# Patient Record
Sex: MALE | Race: WHITE | Employment: OTHER | URBAN - METROPOLITAN AREA
[De-identification: names, ages, dates, MRNs, and addresses within clinical notes are randomized per-mention and may not be internally consistent; named-entity substitution may affect disease eponyms.]

---

## 2023-01-03 ENCOUNTER — HOSPITAL ENCOUNTER (INPATIENT)
Age: 73
LOS: 6 days | Discharge: HOME | DRG: 177 | End: 2023-01-09
Attending: EMERGENCY MEDICINE | Admitting: INTERNAL MEDICINE
Payer: OTHER GOVERNMENT

## 2023-01-03 ENCOUNTER — APPOINTMENT (OUTPATIENT)
Dept: CT IMAGING | Age: 73
DRG: 177 | End: 2023-01-03
Payer: OTHER GOVERNMENT

## 2023-01-03 DIAGNOSIS — E83.42 HYPOMAGNESEMIA: ICD-10-CM

## 2023-01-03 DIAGNOSIS — U07.1 COVID-19: ICD-10-CM

## 2023-01-03 DIAGNOSIS — E87.6 HYPOKALEMIA: ICD-10-CM

## 2023-01-03 DIAGNOSIS — J96.00 ACUTE RESPIRATORY FAILURE DUE TO COVID-19 (HCC): Primary | ICD-10-CM

## 2023-01-03 DIAGNOSIS — U07.1 ACUTE RESPIRATORY FAILURE DUE TO COVID-19 (HCC): Primary | ICD-10-CM

## 2023-01-03 PROBLEM — J96.20 ACUTE ON CHRONIC RESPIRATORY FAILURE (HCC): Status: ACTIVE | Noted: 2023-01-03

## 2023-01-03 LAB
A/G RATIO: 1.3 (ref 1.1–2.2)
ALBUMIN SERPL-MCNC: 3.7 G/DL (ref 3.4–5)
ALP BLD-CCNC: 117 U/L (ref 40–129)
ALT SERPL-CCNC: 35 U/L (ref 10–40)
ANION GAP SERPL CALCULATED.3IONS-SCNC: 10 MMOL/L (ref 3–16)
AST SERPL-CCNC: 45 U/L (ref 15–37)
BASE EXCESS VENOUS: 2.4 MMOL/L (ref -3–3)
BASOPHILS ABSOLUTE: 0 K/UL (ref 0–0.2)
BASOPHILS RELATIVE PERCENT: 1.4 %
BILIRUB SERPL-MCNC: 0.6 MG/DL (ref 0–1)
BUN BLDV-MCNC: 11 MG/DL (ref 7–20)
CALCIUM SERPL-MCNC: 8.3 MG/DL (ref 8.3–10.6)
CARBOXYHEMOGLOBIN: 2.8 % (ref 0–1.5)
CHLORIDE BLD-SCNC: 101 MMOL/L (ref 99–110)
CO2: 29 MMOL/L (ref 21–32)
CREAT SERPL-MCNC: 0.7 MG/DL (ref 0.8–1.3)
EKG ATRIAL RATE: 88 BPM
EKG DIAGNOSIS: NORMAL
EKG P AXIS: 87 DEGREES
EKG P-R INTERVAL: 174 MS
EKG Q-T INTERVAL: 396 MS
EKG QRS DURATION: 70 MS
EKG QTC CALCULATION (BAZETT): 479 MS
EKG R AXIS: -43 DEGREES
EKG T AXIS: 53 DEGREES
EKG VENTRICULAR RATE: 88 BPM
EOSINOPHILS ABSOLUTE: 0 K/UL (ref 0–0.6)
EOSINOPHILS RELATIVE PERCENT: 1.3 %
GFR SERPL CREATININE-BSD FRML MDRD: >60 ML/MIN/{1.73_M2}
GLUCOSE BLD-MCNC: 104 MG/DL (ref 70–99)
HCO3 VENOUS: 29.1 MMOL/L (ref 23–29)
HCT VFR BLD CALC: 38 % (ref 40.5–52.5)
HEMOGLOBIN: 12.4 G/DL (ref 13.5–17.5)
INFLUENZA A: NOT DETECTED
INFLUENZA B: NOT DETECTED
LACTIC ACID, SEPSIS: 1.8 MMOL/L (ref 0.4–1.9)
LYMPHOCYTES ABSOLUTE: 1 K/UL (ref 1–5.1)
LYMPHOCYTES RELATIVE PERCENT: 36.9 %
MAGNESIUM: 1.7 MG/DL (ref 1.8–2.4)
MCH RBC QN AUTO: 31.8 PG (ref 26–34)
MCHC RBC AUTO-ENTMCNC: 32.7 G/DL (ref 31–36)
MCV RBC AUTO: 97.4 FL (ref 80–100)
METHEMOGLOBIN VENOUS: 0.3 %
MONOCYTES ABSOLUTE: 0.4 K/UL (ref 0–1.3)
MONOCYTES RELATIVE PERCENT: 15 %
NEUTROPHILS ABSOLUTE: 1.3 K/UL (ref 1.7–7.7)
NEUTROPHILS RELATIVE PERCENT: 45.4 %
O2 CONTENT, VEN: 7 VOL %
O2 SAT, VEN: 37 %
O2 THERAPY: ABNORMAL
PCO2, VEN: 53.4 MMHG (ref 40–50)
PDW BLD-RTO: 18.2 % (ref 12.4–15.4)
PH VENOUS: 7.35 (ref 7.35–7.45)
PLATELET # BLD: 187 K/UL (ref 135–450)
PMV BLD AUTO: 8.8 FL (ref 5–10.5)
PO2, VEN: 23.1 MMHG (ref 25–40)
POTASSIUM REFLEX MAGNESIUM: 3.4 MMOL/L (ref 3.5–5.1)
PRO-BNP: 119 PG/ML (ref 0–449)
PROCALCITONIN: 0.08 NG/ML (ref 0–0.15)
RBC # BLD: 3.9 M/UL (ref 4.2–5.9)
SARS-COV-2 RNA, RT PCR: DETECTED
SODIUM BLD-SCNC: 140 MMOL/L (ref 136–145)
TCO2 CALC VENOUS: 31 MMOL/L
TOTAL PROTEIN: 6.6 G/DL (ref 6.4–8.2)
TROPONIN: <0.01 NG/ML
WBC # BLD: 2.8 K/UL (ref 4–11)

## 2023-01-03 PROCEDURE — 85025 COMPLETE CBC W/AUTO DIFF WBC: CPT

## 2023-01-03 PROCEDURE — 83605 ASSAY OF LACTIC ACID: CPT

## 2023-01-03 PROCEDURE — 84484 ASSAY OF TROPONIN QUANT: CPT

## 2023-01-03 PROCEDURE — 6370000000 HC RX 637 (ALT 250 FOR IP)

## 2023-01-03 PROCEDURE — 36415 COLL VENOUS BLD VENIPUNCTURE: CPT

## 2023-01-03 PROCEDURE — 93010 ELECTROCARDIOGRAM REPORT: CPT | Performed by: INTERNAL MEDICINE

## 2023-01-03 PROCEDURE — 71260 CT THORAX DX C+: CPT | Performed by: PHYSICIAN ASSISTANT

## 2023-01-03 PROCEDURE — 83880 ASSAY OF NATRIURETIC PEPTIDE: CPT

## 2023-01-03 PROCEDURE — 94640 AIRWAY INHALATION TREATMENT: CPT

## 2023-01-03 PROCEDURE — 93005 ELECTROCARDIOGRAM TRACING: CPT | Performed by: PHYSICIAN ASSISTANT

## 2023-01-03 PROCEDURE — 2580000003 HC RX 258: Performed by: PHYSICIAN ASSISTANT

## 2023-01-03 PROCEDURE — 2700000000 HC OXYGEN THERAPY PER DAY

## 2023-01-03 PROCEDURE — 6370000000 HC RX 637 (ALT 250 FOR IP): Performed by: PHYSICIAN ASSISTANT

## 2023-01-03 PROCEDURE — 94761 N-INVAS EAR/PLS OXIMETRY MLT: CPT

## 2023-01-03 PROCEDURE — 6360000002 HC RX W HCPCS: Performed by: PHYSICIAN ASSISTANT

## 2023-01-03 PROCEDURE — 6360000002 HC RX W HCPCS

## 2023-01-03 PROCEDURE — 2580000003 HC RX 258

## 2023-01-03 PROCEDURE — 99222 1ST HOSP IP/OBS MODERATE 55: CPT

## 2023-01-03 PROCEDURE — 84145 PROCALCITONIN (PCT): CPT

## 2023-01-03 PROCEDURE — 80053 COMPREHEN METABOLIC PANEL: CPT

## 2023-01-03 PROCEDURE — 83735 ASSAY OF MAGNESIUM: CPT

## 2023-01-03 PROCEDURE — 87040 BLOOD CULTURE FOR BACTERIA: CPT

## 2023-01-03 PROCEDURE — 99285 EMERGENCY DEPT VISIT HI MDM: CPT

## 2023-01-03 PROCEDURE — 6360000004 HC RX CONTRAST MEDICATION: Performed by: PHYSICIAN ASSISTANT

## 2023-01-03 PROCEDURE — 96365 THER/PROPH/DIAG IV INF INIT: CPT

## 2023-01-03 PROCEDURE — 82803 BLOOD GASES ANY COMBINATION: CPT

## 2023-01-03 PROCEDURE — 87636 SARSCOV2 & INF A&B AMP PRB: CPT

## 2023-01-03 PROCEDURE — 1200000000 HC SEMI PRIVATE

## 2023-01-03 RX ORDER — PANTOPRAZOLE SODIUM 40 MG/1
1 TABLET, DELAYED RELEASE ORAL DAILY
COMMUNITY
Start: 2022-12-13

## 2023-01-03 RX ORDER — 0.9 % SODIUM CHLORIDE 0.9 %
1000 INTRAVENOUS SOLUTION INTRAVENOUS ONCE
Status: COMPLETED | OUTPATIENT
Start: 2023-01-03 | End: 2023-01-03

## 2023-01-03 RX ORDER — POTASSIUM CHLORIDE 20 MEQ/1
40 TABLET, EXTENDED RELEASE ORAL PRN
Status: DISCONTINUED | OUTPATIENT
Start: 2023-01-03 | End: 2023-01-09 | Stop reason: HOSPADM

## 2023-01-03 RX ORDER — POTASSIUM CHLORIDE 20 MEQ/1
40 TABLET, EXTENDED RELEASE ORAL ONCE
Status: COMPLETED | OUTPATIENT
Start: 2023-01-03 | End: 2023-01-03

## 2023-01-03 RX ORDER — ENOXAPARIN SODIUM 100 MG/ML
30 INJECTION SUBCUTANEOUS 2 TIMES DAILY
Status: DISCONTINUED | OUTPATIENT
Start: 2023-01-03 | End: 2023-01-09 | Stop reason: HOSPADM

## 2023-01-03 RX ORDER — POLYETHYLENE GLYCOL 3350 17 G/17G
17 POWDER, FOR SOLUTION ORAL DAILY PRN
Status: DISCONTINUED | OUTPATIENT
Start: 2023-01-03 | End: 2023-01-09 | Stop reason: HOSPADM

## 2023-01-03 RX ORDER — ACETAMINOPHEN 650 MG/1
650 SUPPOSITORY RECTAL EVERY 6 HOURS PRN
Status: DISCONTINUED | OUTPATIENT
Start: 2023-01-03 | End: 2023-01-09 | Stop reason: HOSPADM

## 2023-01-03 RX ORDER — GABAPENTIN 300 MG/1
1 CAPSULE ORAL 3 TIMES DAILY
COMMUNITY
Start: 2022-12-02

## 2023-01-03 RX ORDER — DRONABINOL 5 MG/1
1 CAPSULE ORAL 2 TIMES DAILY
COMMUNITY
Start: 2022-12-30

## 2023-01-03 RX ORDER — ONDANSETRON 2 MG/ML
4 INJECTION INTRAMUSCULAR; INTRAVENOUS EVERY 6 HOURS PRN
Status: DISCONTINUED | OUTPATIENT
Start: 2023-01-03 | End: 2023-01-09 | Stop reason: HOSPADM

## 2023-01-03 RX ORDER — MORPHINE SULFATE 30 MG/1
1 TABLET, FILM COATED, EXTENDED RELEASE ORAL EVERY 8 HOURS
COMMUNITY
Start: 2022-11-28

## 2023-01-03 RX ORDER — ONDANSETRON HYDROCHLORIDE 8 MG/1
1 TABLET, FILM COATED ORAL EVERY 8 HOURS PRN
COMMUNITY
Start: 2022-11-04

## 2023-01-03 RX ORDER — DEXAMETHASONE SODIUM PHOSPHATE 10 MG/ML
6 INJECTION, SOLUTION INTRAMUSCULAR; INTRAVENOUS EVERY 24 HOURS
Status: DISCONTINUED | OUTPATIENT
Start: 2023-01-03 | End: 2023-01-05

## 2023-01-03 RX ORDER — MAGNESIUM SULFATE IN WATER 40 MG/ML
2000 INJECTION, SOLUTION INTRAVENOUS PRN
Status: DISCONTINUED | OUTPATIENT
Start: 2023-01-03 | End: 2023-01-09 | Stop reason: HOSPADM

## 2023-01-03 RX ORDER — PANTOPRAZOLE SODIUM 40 MG/1
40 TABLET, DELAYED RELEASE ORAL DAILY
Status: DISCONTINUED | OUTPATIENT
Start: 2023-01-03 | End: 2023-01-09 | Stop reason: HOSPADM

## 2023-01-03 RX ORDER — ACETAMINOPHEN 325 MG/1
650 TABLET ORAL EVERY 6 HOURS PRN
Status: DISCONTINUED | OUTPATIENT
Start: 2023-01-03 | End: 2023-01-09 | Stop reason: HOSPADM

## 2023-01-03 RX ORDER — GABAPENTIN 300 MG/1
300 CAPSULE ORAL 3 TIMES DAILY
Status: DISCONTINUED | OUTPATIENT
Start: 2023-01-03 | End: 2023-01-09 | Stop reason: HOSPADM

## 2023-01-03 RX ORDER — OXYCODONE HYDROCHLORIDE 5 MG/1
1 TABLET ORAL
COMMUNITY
Start: 2022-12-20

## 2023-01-03 RX ORDER — LISINOPRIL 30 MG/1
30 TABLET ORAL DAILY
Status: ON HOLD | COMMUNITY
End: 2023-01-09 | Stop reason: HOSPADM

## 2023-01-03 RX ORDER — BUDESONIDE AND FORMOTEROL FUMARATE DIHYDRATE 160; 4.5 UG/1; UG/1
2 AEROSOL RESPIRATORY (INHALATION) 2 TIMES DAILY
COMMUNITY

## 2023-01-03 RX ORDER — DEXAMETHASONE 2 MG/1
1 TABLET ORAL 3 TIMES DAILY
Status: ON HOLD | COMMUNITY
Start: 2022-11-14 | End: 2023-01-09 | Stop reason: HOSPADM

## 2023-01-03 RX ORDER — MAGNESIUM SULFATE IN WATER 40 MG/ML
2000 INJECTION, SOLUTION INTRAVENOUS ONCE
Status: COMPLETED | OUTPATIENT
Start: 2023-01-03 | End: 2023-01-03

## 2023-01-03 RX ORDER — ONDANSETRON 4 MG/1
4 TABLET, ORALLY DISINTEGRATING ORAL EVERY 8 HOURS PRN
Status: DISCONTINUED | OUTPATIENT
Start: 2023-01-03 | End: 2023-01-09 | Stop reason: HOSPADM

## 2023-01-03 RX ORDER — SODIUM CHLORIDE 0.9 % (FLUSH) 0.9 %
5-40 SYRINGE (ML) INJECTION EVERY 12 HOURS SCHEDULED
Status: DISCONTINUED | OUTPATIENT
Start: 2023-01-03 | End: 2023-01-09 | Stop reason: HOSPADM

## 2023-01-03 RX ORDER — FLUTICASONE PROPIONATE AND SALMETEROL 250; 50 UG/1; UG/1
1 POWDER RESPIRATORY (INHALATION) 2 TIMES DAILY
COMMUNITY
End: 2023-01-03

## 2023-01-03 RX ORDER — PROCHLORPERAZINE MALEATE 10 MG
1 TABLET ORAL EVERY 6 HOURS PRN
Status: ON HOLD | COMMUNITY
Start: 2022-11-30 | End: 2023-01-09 | Stop reason: HOSPADM

## 2023-01-03 RX ORDER — SODIUM CHLORIDE 0.9 % (FLUSH) 0.9 %
5-40 SYRINGE (ML) INJECTION PRN
Status: DISCONTINUED | OUTPATIENT
Start: 2023-01-03 | End: 2023-01-09 | Stop reason: HOSPADM

## 2023-01-03 RX ORDER — POTASSIUM CHLORIDE 7.45 MG/ML
10 INJECTION INTRAVENOUS PRN
Status: DISCONTINUED | OUTPATIENT
Start: 2023-01-03 | End: 2023-01-09 | Stop reason: HOSPADM

## 2023-01-03 RX ORDER — AMLODIPINE BESYLATE 10 MG/1
10 TABLET ORAL DAILY
Status: ON HOLD | COMMUNITY
End: 2023-01-09 | Stop reason: HOSPADM

## 2023-01-03 RX ORDER — SODIUM CHLORIDE 9 MG/ML
INJECTION, SOLUTION INTRAVENOUS PRN
Status: DISCONTINUED | OUTPATIENT
Start: 2023-01-03 | End: 2023-01-09 | Stop reason: HOSPADM

## 2023-01-03 RX ADMIN — POTASSIUM CHLORIDE 40 MEQ: 1500 TABLET, EXTENDED RELEASE ORAL at 14:11

## 2023-01-03 RX ADMIN — GABAPENTIN 300 MG: 300 CAPSULE ORAL at 20:21

## 2023-01-03 RX ADMIN — PANTOPRAZOLE SODIUM 40 MG: 40 TABLET, DELAYED RELEASE ORAL at 18:44

## 2023-01-03 RX ADMIN — DEXAMETHASONE SODIUM PHOSPHATE 6 MG: 10 INJECTION INTRAMUSCULAR; INTRAVENOUS at 18:44

## 2023-01-03 RX ADMIN — SODIUM CHLORIDE 1000 ML: 9 INJECTION, SOLUTION INTRAVENOUS at 13:19

## 2023-01-03 RX ADMIN — Medication 10 ML: at 20:22

## 2023-01-03 RX ADMIN — MAGNESIUM SULFATE HEPTAHYDRATE 2000 MG: 40 INJECTION, SOLUTION INTRAVENOUS at 14:14

## 2023-01-03 RX ADMIN — IPRATROPIUM BROMIDE AND ALBUTEROL 1 PUFF: 20; 100 SPRAY, METERED RESPIRATORY (INHALATION) at 20:24

## 2023-01-03 RX ADMIN — ENOXAPARIN SODIUM 30 MG: 100 INJECTION SUBCUTANEOUS at 20:21

## 2023-01-03 RX ADMIN — IOPAMIDOL 85 ML: 755 INJECTION, SOLUTION INTRAVENOUS at 13:45

## 2023-01-03 ASSESSMENT — ENCOUNTER SYMPTOMS
SHORTNESS OF BREATH: 1
COUGH: 1
GASTROINTESTINAL NEGATIVE: 1

## 2023-01-03 ASSESSMENT — PAIN - FUNCTIONAL ASSESSMENT: PAIN_FUNCTIONAL_ASSESSMENT: NONE - DENIES PAIN

## 2023-01-03 NOTE — PLAN OF CARE
66yo PMHx lung cancer on 1-2L O2 at baseline  Presented to [facility] ED with increased SOB  Requiring 3-4L in ED  Covid+  Admit to 2W with telemetry       Ramses Powers PA-C  1/3/2023   3:13 PM

## 2023-01-03 NOTE — SUBJECTIVE & OBJECTIVE
Subjective:     ***    Objective:     Vitals:    01/03/23 1600 01/03/23 1631 01/03/23 1700 01/03/23 1800   BP: 116/75 113/76 105/73    Pulse: 78 71 79    Resp: 26 30 (!) 33    Temp:    97 °F (36.1 °C)   TempSrc:       SpO2: 96% 96%     Weight:       Height:            Intake andOutput:  Current Shift: No intake/output data recorded. Last three shifts: No intake/output data recorded.      Physical Exam      Medications:  Current Facility-Administered Medications   Medication Dose Route Frequency    sodium chloride flush 0.9 % injection 5-40 mL  5-40 mL IntraVENous 2 times per day    sodium chloride flush 0.9 % injection 5-40 mL  5-40 mL IntraVENous PRN    0.9 % sodium chloride infusion   IntraVENous PRN    enoxaparin Sodium (LOVENOX) injection 30 mg  30 mg SubCUTAneous BID    ondansetron (ZOFRAN-ODT) disintegrating tablet 4 mg  4 mg Oral Q8H PRN    Or    ondansetron (ZOFRAN) injection 4 mg  4 mg IntraVENous Q6H PRN    polyethylene glycol (GLYCOLAX) packet 17 g  17 g Oral Daily PRN    acetaminophen (TYLENOL) tablet 650 mg  650 mg Oral Q6H PRN    Or    acetaminophen (TYLENOL) suppository 650 mg  650 mg Rectal Q6H PRN    dexamethasone (PF) (DECADRON) injection 6 mg  6 mg IntraVENous Q24H    albuterol-ipratropium (COMBIVENT RESPIMAT)  MCG/ACT inhaler 1 puff  1 puff Inhalation Q6H    potassium chloride (KLOR-CON M) extended release tablet 40 mEq  40 mEq Oral PRN    Or    potassium bicarb-citric acid (EFFER-K) effervescent tablet 40 mEq  40 mEq Oral PRN    Or    potassium chloride 10 mEq/100 mL IVPB (Peripheral Line)  10 mEq IntraVENous PRN    magnesium sulfate 2000 mg in 50 mL IVPB premix  2,000 mg IntraVENous PRN    pantoprazole (PROTONIX) tablet 40 mg  40 mg Oral Daily    gabapentin (NEURONTIN) capsule 300 mg  300 mg Oral TID        Medications Reviewed    :

## 2023-01-03 NOTE — ED PROVIDER NOTES
I independently performed a history and physical on Marty. All diagnostic, treatment, and disposition decisions were made by myself in conjunction with the advanced practice provider/resident. For further details of [facility] emergency department encounter, please see the MARIA DEL CARMEN/resident's documentation. I personally saw the patient and performed a substantive portion of the visit including all aspects of the medical decision making. Briefly, this is a 67-year-old male presenting for evaluation of shortness of breath. Was referred to ED from his oncologist because of increasing shortness of breath. 86% on room air. He is requiring supplemental O2 increased from baseline. On exam no overt respiratory distress. He has intact mentation no focal neurological deficits. Because of his cancer history, shortness of breath, CT chest to rule out PE has been ordered. He will ultimately require admission for continued management of increased oxygen requirement in the setting of COVID 19. EKG without ischemic change. EKG  The Ekg interpreted by myself in the emergency department in the absence of a cardiologist.  normal sinus rhythm with a rate of 88  Axis is   Left axis deviation  QTc is   479  Intervals and Durations are unremarkable. No specific ST-T wave changes appreciated. No evidence of acute ischemia. No prior EKG available for comparison      I personally saw the patient and independently provided 25 minutes of non-concurrent critical care out of the total shared critical care time provided. I, Dr. Maury Enriquez, am the primary clinician of record. Comment: Please note this report has been produced using speech recognition software and may contain errors related to that system including errors in grammar, punctuation, and spelling, as well as words and phrases that may be inappropriate.  If there are any questions or concerns please feel free to contact the dictating provider for clarification.      Dawson Dorantes MD  01/03/23 4843

## 2023-01-03 NOTE — H&P
Hospital Medicine History & Physical      PCP: No primary care provider on file. Date of Admission: 1/3/2023    Date of Service: Pt seen/examined on 01/03/23       Chief Complaint:    Chief Complaint   Patient presents with    Shortness of Breath     Pt comes in upon recommendation from his oncologist due to increasing SOB that has been worsening over the past week. Pt's POW states that pt tested positive for Covid yesterday. Oxygen is 86% on room air. History Of Present Illness: The patient is a 74 y.o. male with COPD and lung cancer who was referred to [facility] ED ED by his oncologist regarding concern of increased SOB x2 weeks. States this is especially worse with exertion. He also has a productive cough, slightly worse than his baseline cough. He reports compliance with home COPD medications. Denies associated fever, chills, nausea, vomiting, constipation, abdominal pain, or chest pain. He does endorse diarrhea ongoing for the last week, approximately 3-5 loose bowel movements per day. Denies blood in stool. Found to have acute on chronic respiratory failure 2/2 Covid and COPD exacerbation. Past Medical History:        Diagnosis Date    COPD (chronic obstructive pulmonary disease) ([facility] [address]     Lung cancer ([facility] [address]        Past Surgical History:    History reviewed. No pertinent surgical history. Medications Prior to Admission:    Prior to Admission medications    Medication Sig Start Date End Date Taking? Authorizing Provider   dexamethasone (DECADRON) 2 MG tablet Take 1 tablet by mouth in the morning, at noon, and at bedtime 11/14/22  Yes Historical Provider, MD   gabapentin (NEURONTIN) 300 MG capsule Take 1 capsule by mouth in the morning, at noon, and at bedtime. 12/2/22   Historical Provider, MD   dronabinol (MARINOL) 5 MG capsule Take 1 capsule by mouth 2 times daily. Before lunch and dinner.  12/30/22   Historical Provider, MD   amLODIPine (NORVASC) 10 MG tablet Take 10 mg by mouth daily Historical Provider, MD   budesonide-formoterol (SYMBICORT) 160-4.5 MCG/ACT AERO Inhale 2 puffs into the lungs in the morning and at bedtime    Historical Provider, MD   lisinopril (PRINIVIL;ZESTRIL) 30 MG tablet Take 30 mg by mouth daily    Historical Provider, MD   morphine (MS CONTIN) 30 MG extended release tablet Take 1 tablet by mouth in the morning and 1 tablet at noon and 1 tablet in the evening. 11/28/22   Historical Provider, MD   ondansetron (ZOFRAN) 8 MG tablet Take 1 tablet by mouth every 8 hours as needed for Nausea 11/4/22   Historical Provider, MD   oxyCODONE (ROXICODONE) 5 MG immediate release tablet Take 1 tablet by mouth every 4-6 hours as needed for Pain. 12/20/22   Historical Provider, MD   pantoprazole (PROTONIX) 40 MG tablet Take 1 tablet by mouth daily 12/13/22   Historical Provider, MD   prochlorperazine (COMPAZINE) 10 MG tablet Take 1 tablet by mouth every 6 hours as needed for Nausea 11/30/22   Historical Provider, MD       Allergies:  Patient has no known allergies. Social History:  The patient currently lives at home    TOBACCO:   reports that he quit smoking about 13 years ago. His smoking use included cigarettes. He started smoking about 63 years ago. He has never used smokeless tobacco.  ETOH:   reports that he does not currently use alcohol. Family History:   Positive as follows:    History reviewed. No pertinent family history.     REVIEW OF SYSTEMS:     Constitutional: Negative for fever   HENT: Negative for sore throat   Eyes: Negative for redness   Respiratory: +dyspnea, +cough   Cardiovascular: Negative for chest pain   Gastrointestinal: Negative for vomiting, +diarrhea   Genitourinary: Negative for hematuria   Musculoskeletal: Negative for arthralgias   Skin: Negative for rash   Neurological: Negative for syncope   Hematological: Negative for adenopathy   Psychiatric/Behavorial: Negative for anxiety    PHYSICAL EXAM:    /76   Pulse 71   Temp 97.1 °F (36.2 °C) (Oral)   Resp 30   Ht 5' 11\" (1.803 m)   Wt 150 lb (68 kg)   SpO2 96%   BMI 20.92 kg/m²   Gen: No distress. Alert. Eyes: PERRL. No sclera icterus. No conjunctival injection. Neck: No JVD. No Carotid Bruit. Trachea midline. Resp: No accessory muscle use. +Reduced air movement, diffuse wheezing  CV: Regular rate. Regular rhythm. No murmur. No rub. No edema. Peripheral Pulses: +2 palpable, equal bilaterally   GI: Non-tender. Non-distended. No masses. No organomegaly. Normal bowel sounds. No hernia. Skin: Warm and dry. No nodule on exposed extremities. No rash on exposed extremities. M/S: No cyanosis. No joint deformity. No clubbing. Neuro: Awake. Grossly nonfocal    Psych: Oriented x 3. No anxiety. Appears agitated. CBC:   Recent Labs     01/03/23  1255   WBC 2.8*   HGB 12.4*   HCT 38.0*   MCV 97.4        BMP:   Recent Labs     01/03/23  1255      K 3.4*      CO2 29   BUN 11   CREATININE 0.7*     LIVER PROFILE:   Recent Labs     01/03/23  1255   AST 45*   ALT 35   BILITOT 0.6   ALKPHOS 117          CARDIAC ENZYMES  Recent Labs     01/03/23  1255   TROPONINI <0.01       CULTURES  Covid positive  Influenza not detected     EKG:    Normal sinus rhythm  Left axis deviation  Abnormal ECG  No previous ECGs available  Confirmed by DELBERT KELLY, EVANS ([id number]) on 1/3/2023 3:01:27 PM    RADIOLOGY  CT CHEST PULMONARY EMBOLISM W CONTRAST   Final Result   1. Negative for pulmonary embolus. 2. Cavitary 6 cm right upper lobe bronchogenic carcinoma with associated   rightward T2, T3 and T4 vertebral body/costal invasion. 3. Moderate to severe pathologic T3 compression fracture.              ASSESSMENT/PLAN:  #Acute hypoxic respiratory failure  #COPD AE  #COVID  -presented with increasing SOB and productive cough x1 week  -baseline O2 requirement 1L; currently on 3-4L NC --> wean as tolerated  -CTPA negative   -decadron and combivent   -droplet plus precautions  -pulmonology consulted     #Lung cancer  -s/p chemo and radiation  -follows w/ [facility]    #Hypomagnesemia  #Hypokalemia  -replete with IVF  -monitor     #HTN  -BP low on admission  -hold lisinopril and amlodipine for now     #GERD  -continue PPI     DVT Prophylaxis: Lovenox  Diet: No diet orders on file  Code Status: No Order    Beryl Healy PA-C  1/3/2023  5:13 PM

## 2023-01-03 NOTE — ED PROVIDER NOTES
[address] ED  EMERGENCY DEPARTMENT ENCOUNTER        Pt Name: Estephania Gambino  MRN: [id number]  [place] 1950  Date of evaluation: 1/3/2023  Provider: Nya Ornelas PA-C  PCP: No primary care provider on file. Note Started: 12:03 PM EST 1/3/23       I have seen and evaluated this patient with my supervising physician Dr. Toya Hannon. CHIEF COMPLAINT       Chief Complaint   Patient presents with    Shortness of Breath     Pt comes in upon recommendation from his oncologist due to increasing SOB that has been worsening over the past week. Pt's POW states that pt tested positive for Covid yesterday. Oxygen is 86% on room air. HISTORY OF PRESENT ILLNESS: 1 or more Elements     History From: patient  Limitations to history : None    Estephania Gambino is a 74 y.o. male with a past medical history of COPD, lung cancer, former smoker recently completed chemo and radiation brought in today with increased shortness of breath. He does wear oxygen at home typically about 1 L. He has been requiring between 2 and 3 L at home. His home pulse ox was at 86%. Onset of symptoms over the past 1 week. Onset of Symptoms have been persistent since onset. Context includes increased shortness of breath. He does endorse a productive cough. Denies chest pain. Denies fevers chills nausea vomiting diarrhea. Does report decreased appetite. No aggravating symptoms. No alleviating symptoms. He did test positive for COVID yesterday. Has not tried anything at home. Nothing seems to make symptoms better or worse. Otherwise denies any other complaints. Nursing Notes were all reviewed and agreed with or any disagreements were addressed in the HPI. REVIEW OF SYSTEMS :      Review of Systems   Constitutional:  Positive for appetite change. HENT: Negative. Respiratory:  Positive for cough and shortness of breath. Cardiovascular: Negative. Gastrointestinal: Negative.     Genitourinary: Negative. Musculoskeletal: Negative. Skin: Negative. Neurological: Negative. Positives and Pertinent negatives as per HPI. SURGICAL HISTORY   History reviewed. No pertinent surgical history. CURRENTMEDICATIONS       Previous Medications    No medications on file       ALLERGIES     Patient has no known allergies. FAMILYHISTORY     History reviewed. No pertinent family history. SOCIAL HISTORY       Social History     Tobacco Use    Smoking status: Former     Types: Cigarettes     Start date:      Quit date:      Years since quittin.0    Smokeless tobacco: Never   Substance Use Topics    Alcohol use: Not Currently    Drug use: Never       SCREENINGS        Stephen Coma Scale  Eye Opening: Spontaneous  Best Verbal Response: Oriented  Best Motor Response: Obeys commands  Stephen Coma Scale Score: 15                CIWA Assessment  BP: 92/73  Heart Rate: 78           PHYSICAL EXAM  1 or more Elements     ED Triage Vitals [23 1142]   BP Temp Temp Source Heart Rate Resp SpO2 Height Weight   (!) 89/58 97.1 °F (36.2 °C) Oral 88 24 (!) 84 % 5' 11\" (1.803 m) 150 lb (68 kg)       Physical Exam  Vitals and nursing note reviewed. Constitutional:       General: He is awake. He is not in acute distress. Appearance: Normal appearance. He is well-developed. He is ill-appearing. He is not toxic-appearing or diaphoretic. Interventions: Nasal cannula in place. HENT:      Head: Normocephalic and atraumatic. Nose: Nose normal.   Eyes:      General:         Right eye: No discharge. Left eye: No discharge. Cardiovascular:      Rate and Rhythm: Normal rate and regular rhythm. Pulses:           Radial pulses are 2+ on the right side and 2+ on the left side. Heart sounds: Normal heart sounds. No murmur heard. No gallop. Pulmonary:      Effort: Pulmonary effort is normal. No respiratory distress. Breath sounds: Normal breath sounds.  No decreased breath sounds, wheezing, rhonchi or rales. Chest:      Chest wall: No tenderness. Musculoskeletal:         General: No deformity. Normal range of motion. Cervical back: Normal range of motion and neck supple. Right lower leg: No edema. Left lower leg: No edema. Skin:     General: Skin is warm and dry. Neurological:      Mental Status: He is alert and oriented to person, place, and time. Psychiatric:         Behavior: Behavior normal. Behavior is cooperative. DIAGNOSTIC RESULTS   LABS:    Labs Reviewed   COVID-19 & INFLUENZA COMBO - Abnormal; Notable for the following components:       Result Value    SARS-CoV-2 RNA, RT PCR DETECTED (*)     All other components within normal limits   CBC WITH AUTO DIFFERENTIAL - Abnormal; Notable for the following components:    WBC 2.8 (*)     RBC 3.90 (*)     Hemoglobin 12.4 (*)     Hematocrit 38.0 (*)     RDW 18.2 (*)     Neutrophils Absolute 1.3 (*)     All other components within normal limits   COMPREHENSIVE METABOLIC PANEL W/ REFLEX TO MG FOR LOW K - Abnormal; Notable for the following components:    Potassium reflex Magnesium 3.4 (*)     Glucose 104 (*)     Creatinine 0.7 (*)     AST 45 (*)     All other components within normal limits   BLOOD GAS, VENOUS - Abnormal; Notable for the following components:    pCO2, Rajeev 53.4 (*)     pO2, Rajeev 23.1 (*)     HCO3, Venous 29.1 (*)     Carboxyhemoglobin 2.8 (*)     All other components within normal limits   MAGNESIUM - Abnormal; Notable for the following components:    Magnesium 1.70 (*)     All other components within normal limits   CULTURE, BLOOD 2   CULTURE, BLOOD 1   TROPONIN   BRAIN NATRIURETIC PEPTIDE   LACTATE, SEPSIS   PROCALCITONIN       When ordered only abnormal lab results are displayed. All other labs were within normal range or not returned as of this dictation. EKG:  When ordered, EKG's are interpreted by the Emergency Department Physician in the absence of a cardiologist.  Please see their note for interpretation of EKG. RADIOLOGY:   Non-plain film images such as CT, Ultrasound and MRI are read by the radiologist. Plain radiographic images are visualized and preliminarily interpreted by the ED Provider with the below findings:        Interpretation per the Radiologist below, if available at the time of this note:    CT CHEST PULMONARY EMBOLISM W CONTRAST   Final Result   1. Negative for pulmonary embolus. 2. Cavitary 6 cm right upper lobe bronchogenic carcinoma with associated   rightward T2, T3 and T4 vertebral body/costal invasion. 3. Moderate to severe pathologic T3 compression fracture. No results found. No results found. PROCEDURES   Unless otherwise noted below, none     Procedures    CRITICAL CARE TIME (.cctime)     Total Critical Care time was 35 minutes, excluding separately reportable procedures. There was a high probability of clinically significant/life threatening deterioration in the patient's condition which required my urgent intervention. PAST MEDICAL HISTORY      has a past medical history of COPD (chronic obstructive pulmonary disease) ([facility] [address] and Lung cancer ([facility] [address]      EMERGENCY DEPARTMENT COURSE and DIFFERENTIAL DIAGNOSIS/MDM:   Vitals:    Vitals:    01/03/23 1142 01/03/23 1303 01/03/23 1331   BP: (!) 89/58 96/61 92/73   Pulse: 88 81 78   Resp: 24 26 22   Temp: 97.1 °F (36.2 °C)     TempSrc: Oral     SpO2: (!) 84%  95%   Weight: 150 lb (68 kg)     Height: 5' 11\" (1.803 m)         Patient was given the following medications:  Medications   magnesium sulfate 2000 mg in 50 mL IVPB premix (2,000 mg IntraVENous New Bag 1/3/23 1414)   0.9 % sodium chloride bolus (1,000 mLs IntraVENous New Bag 1/3/23 1319)   iopamidol (ISOVUE-370) 76 % injection 85 mL (85 mLs IntraVENous Given 1/3/23 1345)   potassium chloride (KLOR-CON M) extended release tablet 40 mEq (40 mEq Oral Given 1/3/23 1411)             Is this patient to be included in the SEP-1 Core Measure due to severe sepsis or septic shock? No   Exclusion criteria - the patient is NOT to be included for SEP-1 Core Measure due to:  Viral etiology found or highly suspected (including COVID-19) without concomitant bacterial infection    Chronic Conditions affecting care:    has a past medical history of COPD (chronic obstructive pulmonary disease) ([facility] [address] and Lung cancer ([facility] [address] CONSULTS: (Who and What was discussed)  None      Social Determinants : None    Records Reviewed (Source): none    CC/HPI Summary, DDx, ED Course, and Reassessment:     Patient brought in today by private vehicle with complaints of increased shortness of breath. Patient was found to be hypoxic to 84%. He was placed on 3 L nasal cannula here. He typically uses 1 to 2 L at home. He appears chronically ill at baseline. He is in no acute respiratory distress at this time. Old labs and records reviewed at this time. Patient seen by myself as well as my attending. Disposition Considerations (tests considered but not done, Admit vs D/C, Shared Decision Making, Pt Expectation of Test or Tx.):     VBG shows a pH of 7.354. CO2 of 53.4. EKG was obtained, please see my attendings note. CBC shows a white count of 2.8. Hemoglobin of 12.4. Potassium of 3.4. Will supplement with p.o. potassium. Kidney function liver function unremarkable. Troponin less than 0.01. COVID positive. Flu negative. Lactic acid 1.8. BNP of 119. Magnesium of 1.7. We will plan to supplement. CT scan which was read and interpreted by radiology is negative for PE. Cavitary 6 cm right upper lobe bronchogenic carcinoma associated with rightward T2-T3 and T4 vertebral bodies/gallstone version. Moderate to severe pathologic T3 compression fracture. Patient does have known lung cancer. Plan will be at this time to admit for acute respiratory failure secondary to COVID-19 as he is requiring above his baseline of oxygen.   We will plan to admit and consult the hospitalist at this time. Patient was stable at time of admission. I am the Primary Clinician of Record. FINAL IMPRESSION      1. Acute respiratory failure due to COVID-19 ([facility] [address]    2. Hypokalemia    3. Hypomagnesemia    4. COVID-19          DISPOSITION/PLAN     DISPOSITION Decision To Admit 01/03/2023 01:56:53 PM      PATIENT REFERRED TO:  No follow-up provider specified.     DISCHARGE MEDICATIONS:  New Prescriptions    No medications on file       DISCONTINUED MEDICATIONS:  Discontinued Medications    No medications on file              (Please note that portions of this note were completed with a voice recognition program.  Efforts were made to edit the dictations but occasionally words are mis-transcribed.)    Iman Landers PA-C (electronically signed)       Iman Landers PA-C  01/03/23 [id number]

## 2023-01-04 LAB
A/G RATIO: 1.3 (ref 1.1–2.2)
ALBUMIN SERPL-MCNC: 3.3 G/DL (ref 3.4–5)
ALP BLD-CCNC: 100 U/L (ref 40–129)
ALT SERPL-CCNC: 26 U/L (ref 10–40)
ANION GAP SERPL CALCULATED.3IONS-SCNC: 11 MMOL/L (ref 3–16)
ANISOCYTOSIS: ABNORMAL
AST SERPL-CCNC: 28 U/L (ref 15–37)
BANDED NEUTROPHILS RELATIVE PERCENT: 2 % (ref 0–7)
BASOPHILS ABSOLUTE: 0 K/UL (ref 0–0.2)
BASOPHILS RELATIVE PERCENT: 0 %
BILIRUB SERPL-MCNC: 0.5 MG/DL (ref 0–1)
BUN BLDV-MCNC: 8 MG/DL (ref 7–20)
CALCIUM SERPL-MCNC: 7.9 MG/DL (ref 8.3–10.6)
CHLORIDE BLD-SCNC: 104 MMOL/L (ref 99–110)
CO2: 24 MMOL/L (ref 21–32)
CREAT SERPL-MCNC: <0.5 MG/DL (ref 0.8–1.3)
EOSINOPHILS ABSOLUTE: 0 K/UL (ref 0–0.6)
EOSINOPHILS RELATIVE PERCENT: 0 %
GFR SERPL CREATININE-BSD FRML MDRD: >60 ML/MIN/{1.73_M2}
GLUCOSE BLD-MCNC: 97 MG/DL (ref 70–99)
HCT VFR BLD CALC: 33.6 % (ref 40.5–52.5)
HEMOGLOBIN: 11.4 G/DL (ref 13.5–17.5)
LYMPHOCYTES ABSOLUTE: 0.6 K/UL (ref 1–5.1)
LYMPHOCYTES RELATIVE PERCENT: 23 %
MCH RBC QN AUTO: 32.6 PG (ref 26–34)
MCHC RBC AUTO-ENTMCNC: 34 G/DL (ref 31–36)
MCV RBC AUTO: 95.8 FL (ref 80–100)
MONOCYTES ABSOLUTE: 0.6 K/UL (ref 0–1.3)
MONOCYTES RELATIVE PERCENT: 22 %
NEUTROPHILS ABSOLUTE: 1.5 K/UL (ref 1.7–7.7)
NEUTROPHILS RELATIVE PERCENT: 53 %
PDW BLD-RTO: 18.2 % (ref 12.4–15.4)
PLATELET # BLD: 180 K/UL (ref 135–450)
PLATELET SLIDE REVIEW: ADEQUATE
PMV BLD AUTO: 9.5 FL (ref 5–10.5)
POTASSIUM REFLEX MAGNESIUM: 4.2 MMOL/L (ref 3.5–5.1)
RBC # BLD: 3.51 M/UL (ref 4.2–5.9)
SLIDE REVIEW: ABNORMAL
SODIUM BLD-SCNC: 139 MMOL/L (ref 136–145)
TOTAL PROTEIN: 5.8 G/DL (ref 6.4–8.2)
WBC # BLD: 2.8 K/UL (ref 4–11)

## 2023-01-04 PROCEDURE — 1200000000 HC SEMI PRIVATE

## 2023-01-04 PROCEDURE — 6360000002 HC RX W HCPCS

## 2023-01-04 PROCEDURE — 2700000000 HC OXYGEN THERAPY PER DAY

## 2023-01-04 PROCEDURE — 94640 AIRWAY INHALATION TREATMENT: CPT

## 2023-01-04 PROCEDURE — 99233 SBSQ HOSP IP/OBS HIGH 50: CPT | Performed by: INTERNAL MEDICINE

## 2023-01-04 PROCEDURE — 36415 COLL VENOUS BLD VENIPUNCTURE: CPT

## 2023-01-04 PROCEDURE — 6370000000 HC RX 637 (ALT 250 FOR IP)

## 2023-01-04 PROCEDURE — 80053 COMPREHEN METABOLIC PANEL: CPT

## 2023-01-04 PROCEDURE — 2580000003 HC RX 258

## 2023-01-04 PROCEDURE — 94761 N-INVAS EAR/PLS OXIMETRY MLT: CPT

## 2023-01-04 PROCEDURE — 99223 1ST HOSP IP/OBS HIGH 75: CPT | Performed by: INTERNAL MEDICINE

## 2023-01-04 PROCEDURE — 85025 COMPLETE CBC W/AUTO DIFF WBC: CPT

## 2023-01-04 RX ADMIN — Medication 10 ML: at 08:20

## 2023-01-04 RX ADMIN — ENOXAPARIN SODIUM 30 MG: 100 INJECTION SUBCUTANEOUS at 19:57

## 2023-01-04 RX ADMIN — DEXAMETHASONE SODIUM PHOSPHATE 6 MG: 10 INJECTION INTRAMUSCULAR; INTRAVENOUS at 18:39

## 2023-01-04 RX ADMIN — Medication 10 ML: at 19:57

## 2023-01-04 RX ADMIN — GABAPENTIN 300 MG: 300 CAPSULE ORAL at 19:57

## 2023-01-04 RX ADMIN — Medication 10 ML: at 08:19

## 2023-01-04 RX ADMIN — PANTOPRAZOLE SODIUM 40 MG: 40 TABLET, DELAYED RELEASE ORAL at 08:16

## 2023-01-04 RX ADMIN — GABAPENTIN 300 MG: 300 CAPSULE ORAL at 08:16

## 2023-01-04 RX ADMIN — ENOXAPARIN SODIUM 30 MG: 100 INJECTION SUBCUTANEOUS at 08:17

## 2023-01-04 NOTE — PROGRESS NOTES
Pt a/o. Am assessment completed see flow sheet. Pt denies any pain/ needs at this time. Call light within reach. Will continue to monitor.   Bedside Mobility Assessment Tool (BMAT):     Assessment Level 1- Sit and Shake    1. From a semi-reclined position, ask patient to sit up and rotate to a seated position at the side of the bed. Can use the bedrail.    2. Ask patient to reach out and grab your hand and shake making sure patient reaches across his/her midline.   Pass- Patient is able to come to a seated position, maintain core strength. Maintains seated balance while reaching across midline. Move on to Assessment Level 2.     Assessment Level 2- Stretch and Point   1. With patient in seated position at the side of the bed, have patient place both feet on the floor (or stool) with knees no higher than hips.    2. Ask patient to stretch one leg and straighten the knee, then bend the ankle/flex and point the toes. If appropriate, repeat with the other leg.   Pass- Patient is able to demonstrate appropriate quad strength on intended weight bearing limb(s). Move onto Assessment Level 3.     Assessment Level 3- Stand   1. Ask patient to elevate off the bed or chair (seated to standing) using an assistive device (cane, bedrail).    2. Patient should be able to raise buttocks off be and hold for a count of five. May repeat once.   Pass- Patient maintains standing stability for at least 5 seconds, proceed to assessment level 4.    Assessment Level 4- Walk   1. Ask patient to march in place at bedside.    2. Then ask patient to advance step and return each foot. Some medical conditions may render a patient from stepping backwards, use your best clinical judgement.   Fail- Patient not able to complete tasks OR requires use of assistive device. Patient is MOBILITY LEVEL 3.       Mobility Level- 3

## 2023-01-04 NOTE — PROGRESS NOTES
Comprehensive Nutrition Assessment    Type and Reason for Visit:  Initial, Positive Nutrition Screen (MST 4)    Nutrition Recommendations/Plan:   Continue Regular diet  Added ensure enlive TID     Malnutrition Assessment:  Malnutrition Status: At risk for malnutrition (Comment) (01/04/23 1600)    Context:  Acute Illness     Findings of the 6 clinical characteristics of malnutrition:  Energy Intake:  Unable to assess  Weight Loss:  Unable to assess     Body Fat Loss:  Unable to assess     Muscle Mass Loss:  Unable to assess    Fluid Accumulation:  Unable to assess     Strength:  Not Performed    Nutrition Assessment:    Pt. nutritionally compromised AEB admitted with 2 week of SOB and feeling ill found to be + COIVD. At risk for further nutrition compromise r/t recently lost ~ 30-40 # r/t new dx of lung cancer with chemoradition completed . Will add ensure enlive to meals . Nutrition Related Findings:    pt a & O x 4 ;COPD and lung cancer tx at [facility] s/p biopsy, he finished his chemoradiation therapy; reports sig wt loss since dx Wound Type: None       Current Nutrition Intake & Therapies:    Average Meal Intake: Unable to assess  Average Supplements Intake: None Ordered  ADULT DIET; Regular    Anthropometric Measures:  Height: 5' 11\" (180.3 cm)  Ideal Body Weight (IBW): 172 lbs (78 kg)    Admission Body Weight: 150 lb (68 kg)  Current Body Weight: 150 lb (68 kg), 87.2 % IBW.  Weight Source: Stated  Current BMI (kg/m2): 20.9  Usual Body Weight: 180 lb (81.6 kg) (per reprt pt has lost 30-40# since Cnacer dx)  % Weight Change (Calculated): -16.7                    BMI Categories: Underweight (BMI less than 22) age over 72    Estimated Daily Nutrient Needs:  Energy Requirements Based On: Kcal/kg  Weight Used for Energy Requirements: Current  Energy (kcal/day): 0724-0864 based ~ 30-33 kcal/kg cbw  Weight Used for Protein Requirements: Current  Protein (g/day): 82 based ~ 1.2 gr/kg cbw  Method Used for Fluid Requirements: 1 ml/kcal  Fluid (ml/day): 9876-6194    Nutrition Diagnosis:   Unintended weight loss related to catabolic illness as evidenced by weight loss    Nutrition Interventions:   Food and/or Nutrient Delivery: Continue Current Diet, Start Oral Nutrition Supplement  Nutrition Education/Counseling: No recommendation at this time  Coordination of Nutrition Care: Continue to monitor while inpatient       Goals:     Goals: PO intake 50% or greater, by next RD assessment       Nutrition Monitoring and Evaluation:   Behavioral-Environmental Outcomes: None Identified  Food/Nutrient Intake Outcomes: Food and Nutrient Intake, Supplement Intake       Discharge Planning:     Too soon to determine     Radha Vickers, [address] [place] [address],   Contact: [phone]

## 2023-01-04 NOTE — CARE COORDINATION
Case Management Assessment  Initial Evaluation      Patient Name: Lisa Saez  YOB: 1950  Diagnosis: Hypokalemia [E87.6]  Hypomagnesemia [E83.42]  Acute on chronic respiratory failure ([address] [J96.20]  COVID-19 [U07.1]  Acute respiratory failure due to COVID-19 ([facility] [address] [U07.1, J96.00]  Date / Time: 1/3/2023 11:34 AM    Admission status/Date:1/3/2023 inpt  Chart Reviewed: Yes      Patient Interviewed: Yes   Family Interviewed:  No      Hospitalization in the last 30 days:  No      Health Care Decision Maker :   Primary Decision Maker: Anh Roy - Spouse - [phone]    (CM - must 1st enter selection under Navigator - emergency contact- [place] Relationship and pick relationship)   Who do you trust or have selected to make healthcare decisions for you      Met with: pt  Interview conducted  (bedside/phone):bedside    Current PCP:   Mary JOYNER [address] required for SNF : Y          3 night stay required - N    ADLS  Support Systems/Care Needs: Family Members, Friends/Neighbors  Transportation:  spouse     Meal Preparation: self/spouse    Housing  Living Arrangements: lives in house alone, spouse stays with him at times  Steps: 2  Intent for return to present living arrangements: Yes  Identified Issues:     Rosa Andrews 76  Active with [address] : No Agency:(Services)  Type of Home Care Services: None  Passport/Waiver : No  :                      Phone Number:    Passport/Waiver Services:           [address] [place] [address] Provider: pt not sure-[facility]  Equipment:   Walker___Cane___RTS___ BSC___Shower Chair___Hospital Bed___W/C____Other________  02 at __2__Liter(s)---wears(frequency)_cont______ HHN ___ CPAP___ BiPap___   N/A____      Home O2 Use :  Yes    If No for home O2---if presently on O2 during hospitalization:  Yes  if yes CM to follow for potential DC O2 need  Informed of need for care provider to bring portable home O2 tank on day of discharge for nursing to connect prior to leaving:   Yes  Verbalized agreement/Understanding:   Yes    Community Service Affiliation  Dialysis:  No      Other Community Services:none     DISCHARGE PLAN: Explained Case Management role/services. Chart reviewed. Met with pt at bedside. Pt is from home alone however his spouse does stay with him from time to time. Plan is for pt to return home. Pt is active for home O2 @ 2L he is unsure of agency, likely is [facility] as pt is [place]. Follow.

## 2023-01-04 NOTE — PROGRESS NOTES
Pt awake at this time. Pt NPO all food and drink removed from bedside.  Pt denies any needs at this time

## 2023-01-04 NOTE — PROGRESS NOTES
IM Progress Note    Admit Date:  1/3/2023    Patient with history of COPD and lung cancer presenting with increased shortness of breath, admitted for exacerbation of COPD, COVID-positive. Patient normally on oxygen 1 to 2 L, required O2 up to 3 L on presentation    Subjective:  Mr. Dillon Prabhakar continues to require oxygen 3 L. Shortness of breath is improved    Objective:   /61   Pulse 68   Temp 97.3 °F (36.3 °C) (Oral)   Resp 18   Ht 5' 11\" (1.803 m)   Wt 150 lb (68 kg)   SpO2 90%   BMI 20.92 kg/m²     Intake/Output Summary (Last 24 hours) at 1/4/2023 1523  Last data filed at 1/4/2023 1237  Gross per 24 hour   Intake --   Output 750 ml   Net -750 ml         Physical Exam:  General:  Awake, alert, NAD  Skin:  Warm and dry  Neck:  JVD absent. Neck supple  Chest: Diminished breath sounds. no wheezes, rales or rhonchi. Cardiovascular:  RRR ,S1S2 normal  Abdomen:  Soft, non tender, non distended, BS +  Extremities:  No edema. Intact peripheral pulses. Brisk cap refill, < 2 secs  Neuro: non focal      Medications:   Scheduled Meds:   sodium chloride flush  5-40 mL IntraVENous 2 times per day    enoxaparin  30 mg SubCUTAneous BID    dexamethasone  6 mg IntraVENous Q24H    pantoprazole  40 mg Oral Daily    gabapentin  300 mg Oral TID    albuterol-ipratropium  1 puff Inhalation 4x daily       Continuous Infusions:   sodium chloride         Data:  CBC:   Recent Labs     01/03/23  1255 01/04/23  0518   WBC 2.8* 2.8*   RBC 3.90* 3.51*   HGB 12.4* 11.4*   HCT 38.0* 33.6*   MCV 97.4 95.8   RDW 18.2* 18.2*    180     BMP:   Recent Labs     01/03/23  1255 01/04/23  0518    139   K 3.4* 4.2    104   CO2 29 24   BUN 11 8   CREATININE 0.7* <0.5*     BNP: No results for input(s): BNP in the last 72 hours. PT/INR: No results for input(s): PROTIME, INR in the last 72 hours. APTT: No results for input(s): APTT in the last 72 hours.   CARDIAC ENZYMES:   Recent Labs     01/03/23  1255   TROPONINI <0.01 FASTING LIPID PANEL:No results found for: CHOL, HDL, TRIG  LIVER PROFILE:   Recent Labs     01/03/23  1255 01/04/23  0518   AST 45* 28   ALT 35 26   BILITOT 0.6 0.5   ALKPHOS 117 100          Cultures    Covid positive  Influenza not detected        Radiology  CT CHEST PULMONARY EMBOLISM W CONTRAST   Final Result   1. Negative for pulmonary embolus. 2. Cavitary 6 cm right upper lobe bronchogenic carcinoma with associated   rightward T2, T3 and T4 vertebral body/costal invasion. 3. Moderate to severe pathologic T3 compression fracture. Assessment:  Principal Problem:    Acute on chronic respiratory failure (HCC)  Active Problems:    COPD (chronic obstructive pulmonary disease) (HCC)    Lung cancer (HCC)    COVID-19    Hypomagnesemia    Hypokalemia  Resolved Problems:    * No resolved hospital problems. *      Plan:    #Acute hypoxic respiratory failure  #COPD AE  #COVID  -presented with increasing SOB and productive cough x1 week  -baseline O2 requirement 1L; currently on 3-4L NC --> wean as tolerated  -CTPA negative   -decadron and combivent   -droplet plus precautions  -pulmonology consulted   Currently requiring 3 L of oxygen;  continue to wean O2 as tolerated at baseline. Patient is on oxygen 1 to 2 L     #Lung cancer  -s/p chemo and radiation  -follows w/ [facility]     #Hypomagnesemia  #Hypokalemia  -replete with IVF  -monitor      #HTN  -BP low on admission  -hold lisinopril and amlodipine for now      #GERD  -continue PPI      DVT Prophylaxis: Lovenox  ADULT DIET; Regular   Full Code    Wean O2 as tolerated.    if patient is back to his baseline likely discharge tomorrow      Renea Tilley MD   1/4/2023 3:23 PM

## 2023-01-04 NOTE — PROGRESS NOTES
Bedside report given to Whit KRISHNA RN pt in stable condition no needs at this time.  Call light within reach

## 2023-01-04 NOTE — ACP (ADVANCE CARE PLANNING)
Advance Care Planning     General Advance Care Planning (ACP) Conversation    Date of Conversation: 1/3/2023  Conducted with: Patient with Decision Making Capacity    Healthcare Decision Maker:    Primary Decision Maker: Kenton Rojo - [facility] - [phone]  Click here to complete Healthcare Decision Makers including selection of the Healthcare Decision Maker Relationship (ie \"Primary\"). Today we documented Decision Maker(s) consistent with Legal Next of Kin hierarchy.     Content/Action Overview:    Reviewed DNR/DNI and patient elects Full Code (Attempt Resuscitation)    Length of Voluntary ACP Conversation in minutes:  <16 minutes (Non-Billable)    Jack Benitez RN

## 2023-01-04 NOTE — FLOWSHEET NOTE
01/03/23 2008   Vital Signs   Temp 97.2 °F (36.2 °C)   Temp Source Oral   Heart Rate 74   Heart Rate Source Monitor   Resp 18   /73   MAP (Calculated) 83   BP Location Right upper arm   BP Method Automatic   Patient Position Semi fowlers   Level of Consciousness 0   MEWS Score 1   Oxygen Therapy   SpO2 (!) 85 %   O2 Device Nasal cannula   O2 Flow Rate (L/min) 2 L/min   Pt A/O assessment completed. 85% on 2 liters increased to 3 liters 91%. Pt states he has a cough but not coughing anything up at this time. Meds given per MAR. Pt aware of NPO status at midnight. Pt denies any needs.  Call light within reach bed alarm on

## 2023-01-04 NOTE — PROGRESS NOTES
RT Inhaler-Nebulizer Bronchodilator Protocol Note    There is a bronchodilator order in the chart from a provider indicating to follow the RT Bronchodilator Protocol and there is an Initiate RT Inhaler-Nebulizer Bronchodilator Protocol order as well (see protocol at bottom of note). CXR Findings:  No results found. The findings from the last RT Protocol Assessment were as follows:   History Pulmonary Disease: Chronic pulmonary disease  Respiratory Pattern: Dyspnea on exertion or RR 21-25 bpm  Breath Sounds: Slightly diminished and/or crackles  Cough: Strong, spontaneous, non-productive  Indication for Bronchodilator Therapy: Decreased or absent breath sounds  Bronchodilator Assessment Score: 6    Aerosolized bronchodilator medication orders have been revised according to the RT Inhaler-Nebulizer Bronchodilator Protocol below. Respiratory Therapist to perform RT Therapy Protocol Assessment initially then follow the protocol. Repeat RT Therapy Protocol Assessment PRN for score 0-3 or on second treatment, BID, and PRN for scores above 3. No Indications - adjust the frequency to every 6 hours PRN wheezing or bronchospasm, if no treatments needed after 48 hours then discontinue using Per Protocol order mode. If indication present, adjust the RT bronchodilator orders based on the Bronchodilator Assessment Score as indicated below. Use Inhaler orders unless patient has one or more of the following: on home nebulizer, not able to hold breath for 10 seconds, is not alert and oriented, cannot activate and use MDI correctly, or respiratory rate 25 breaths per minute or more, then use the equivalent nebulizer order(s) with same Frequency and PRN reasons based on the score. If a patient is on this medication at home then do not decrease Frequency below that used at home.     0-3 - enter or revise RT bronchodilator order(s) to equivalent RT Bronchodilator order with Frequency of every 4 hours PRN for wheezing or increased work of breathing using Per Protocol order mode. 4-6 - enter or revise RT Bronchodilator order(s) to two equivalent RT bronchodilator orders with one order with BID Frequency and one order with Frequency of every 4 hours PRN wheezing or increased work of breathing using Per Protocol order mode. 7-10 - enter or revise RT Bronchodilator order(s) to two equivalent RT bronchodilator orders with one order with TID Frequency and one order with Frequency of every 4 hours PRN wheezing or increased work of breathing using Per Protocol order mode. 11-13 - enter or revise RT Bronchodilator order(s) to one equivalent RT bronchodilator order with QID Frequency and an Albuterol order with Frequency of every 4 hours PRN wheezing or increased work of breathing using Per Protocol order mode. Greater than 13 - enter or revise RT Bronchodilator order(s) to one equivalent RT bronchodilator order with every 4 hours Frequency and an Albuterol order with Frequency of every 2 hours PRN wheezing or increased work of breathing using Per Protocol order mode. PATIENT WILL BENEFIT FROM TREATMENTS.      Electronically signed by Jose Galan RCP on 1/3/2023 at 8:29 PM

## 2023-01-04 NOTE — PROGRESS NOTES
RT Inhaler-Nebulizer Bronchodilator Protocol Note    There is a bronchodilator order in the chart from a provider indicating to follow the RT Bronchodilator Protocol and there is an Initiate RT Inhaler-Nebulizer Bronchodilator Protocol order as well (see protocol at bottom of note). CXR Findings:  No results found. The findings from the last RT Protocol Assessment were as follows:   History Pulmonary Disease: (P) Chronic pulmonary disease  Respiratory Pattern: (P) Dyspnea on exertion or RR 21-25 bpm  Breath Sounds: (P) Slightly diminished and/or crackles  Cough: (P) Strong, spontaneous, non-productive  Indication for Bronchodilator Therapy: (P) Decreased or absent breath sounds  Bronchodilator Assessment Score: (P) 6    Aerosolized bronchodilator medication orders have been revised according to the RT Inhaler-Nebulizer Bronchodilator Protocol below. Respiratory Therapist to perform RT Therapy Protocol Assessment initially then follow the protocol. Repeat RT Therapy Protocol Assessment PRN for score 0-3 or on second treatment, BID, and PRN for scores above 3. No Indications - adjust the frequency to every 6 hours PRN wheezing or bronchospasm, if no treatments needed after 48 hours then discontinue using Per Protocol order mode. If indication present, adjust the RT bronchodilator orders based on the Bronchodilator Assessment Score as indicated below. Use Inhaler orders unless patient has one or more of the following: on home nebulizer, not able to hold breath for 10 seconds, is not alert and oriented, cannot activate and use MDI correctly, or respiratory rate 25 breaths per minute or more, then use the equivalent nebulizer order(s) with same Frequency and PRN reasons based on the score. If a patient is on this medication at home then do not decrease Frequency below that used at home.     0-3 - enter or revise RT bronchodilator order(s) to equivalent RT Bronchodilator order with Frequency of every 4 hours PRN for wheezing or increased work of breathing using Per Protocol order mode. 4-6 - enter or revise RT Bronchodilator order(s) to two equivalent RT bronchodilator orders with one order with BID Frequency and one order with Frequency of every 4 hours PRN wheezing or increased work of breathing using Per Protocol order mode. 7-10 - enter or revise RT Bronchodilator order(s) to two equivalent RT bronchodilator orders with one order with TID Frequency and one order with Frequency of every 4 hours PRN wheezing or increased work of breathing using Per Protocol order mode. 11-13 - enter or revise RT Bronchodilator order(s) to one equivalent RT bronchodilator order with QID Frequency and an Albuterol order with Frequency of every 4 hours PRN wheezing or increased work of breathing using Per Protocol order mode. Greater than 13 - enter or revise RT Bronchodilator order(s) to one equivalent RT bronchodilator order with every 4 hours Frequency and an Albuterol order with Frequency of every 2 hours PRN wheezing or increased work of breathing using Per Protocol order mode. RT to enter RT Home Evaluation for COPD & MDI Assessment order using Per Protocol order mode.     Electronically signed by Alok Viveros RCP on 1/4/2023 at 7:25 AM

## 2023-01-04 NOTE — CONSULTS
P [facility]                                 Pulmonary Consult Antonio Hanna Note :                                                                  CHIEF COMPLAINT: SOB     Consulting provider: Elver Tolentino PA-C  Reason covid ,lung ca  HPI:   The patient is a 74 y.o. male with COPD and lung cancer who usually follows in the [facility] s/p biopsy, that shows non-small cell carcinoma, he finished his chemoradiation therapy by Dr. Helena Whiting    The patient was referred to [address] [facility] ED ED by his oncologist regarding concern of increased SOB x2 weeks. States this is especially worse with exertion. He also has a productive cough, slightly worse than his baseline cough. He reports compliance with home COPD medications. Denies associated fever, chills, nausea, vomiting, constipation, abdominal pain, or chest pain. He does endorse diarrhea ongoing for the last week, approximately 3-5 loose bowel movements per day. Denies blood in stool. Past Medical History:   Diagnosis Date    COPD (chronic obstructive pulmonary disease) ([facility]     Lung cancer ([facility]        Past Surgical History:    History reviewed. No pertinent surgical history. Allergies:  has No Known Allergies. Social History:    TOBACCO:   reports that he quit smoking about 13 years ago. His smoking use included cigarettes. He started smoking about 63 years ago. He has never used smokeless tobacco.  ETOH:   reports that he does not currently use alcohol. Family History:   History reviewed. No pertinent family history.     Current Medications:    Current Facility-Administered Medications:     sodium chloride flush 0.9 % injection 5-40 mL, 5-40 mL, IntraVENous, 2 times per day, Priyanka Jolly PA-C, 10 mL at 01/04/23 0820    sodium chloride flush 0.9 % injection 5-40 mL, 5-40 mL, IntraVENous, PRN, Priyanka Jolly PA-C, 10 mL at 01/04/23 0819    0.9 % sodium chloride infusion, , IntraVENous, PRN, Elver Tolentino PA-C enoxaparin Sodium (LOVENOX) injection 30 mg, 30 mg, SubCUTAneous, BID, Priyanka Jolly PA-C, 30 mg at 01/04/23 0817    ondansetron (ZOFRAN-ODT) disintegrating tablet 4 mg, 4 mg, Oral, Q8H PRN **OR** ondansetron (ZOFRAN) injection 4 mg, 4 mg, IntraVENous, Q6H PRN, Priyanka Jolly PA-C    polyethylene glycol (GLYCOLAX) packet 17 g, 17 g, Oral, Daily PRN, Priyanka Jolly PA-C    acetaminophen (TYLENOL) tablet 650 mg, 650 mg, Oral, Q6H PRN **OR** acetaminophen (TYLENOL) suppository 650 mg, 650 mg, Rectal, Q6H PRN, Priyanka Jolly PA-C    dexamethasone (PF) (DECADRON) injection 6 mg, 6 mg, IntraVENous, Q24H, Priyanka Jolly PA-C, 6 mg at 01/03/23 1844    potassium chloride (KLOR-CON M) extended release tablet 40 mEq, 40 mEq, Oral, PRN **OR** potassium bicarb-citric acid (EFFER-K) effervescent tablet 40 mEq, 40 mEq, Oral, PRN **OR** potassium chloride 10 mEq/100 mL IVPB (Peripheral Line), 10 mEq, IntraVENous, PRN, Priyanka Jolly PA-C    magnesium sulfate 2000 mg in 50 mL IVPB premix, 2,000 mg, IntraVENous, PRN, Priyanka Jolly PA-C    pantoprazole (PROTONIX) tablet 40 mg, 40 mg, Oral, Daily, Priyanka Jolly PA-C, 40 mg at 01/04/23 0816    gabapentin (NEURONTIN) capsule 300 mg, 300 mg, Oral, TID, Priyanka Jolly PA-C, 300 mg at 01/04/23 0816    albuterol-ipratropium (COMBIVENT RESPIMAT)  MCG/ACT inhaler 1 puff, 1 puff, Inhalation, 4x daily, Ricky Tse MD, 1 puff at 01/04/23 6095    albuterol-ipratropium (COMBIVENT RESPIMAT)  MCG/ACT inhaler 1 puff, 1 puff, Inhalation, Q4H PRN, Ricky Tse MD      REVIEW OF SYSTEMS:  Constitutional: Negative for fever  HENT: Negative for sore throat  Eyes: Negative for redness   Respiratory: +dyspnea, cough  Cardiovascular: Negative for chest pain  Gastrointestinal: Negative for vomiting, diarrhea   Genitourinary: Negative for hematuria   Musculoskeletal: Negative for arthralgias   Skin: Negative for rash  Neurological: Negative for syncope  Hematological: Negative for adenopathy  Psychiatric/Behavorial: Negative for anxiety      Objective:   PHYSICAL EXAM:    Blood pressure 112/67, pulse 87, temperature 97.7 °F (36.5 °C), resp. rate 18, height 5' 11\" (1.803 m), weight 150 lb (68 kg), SpO2 90 %.' on RA  Gen: No distress. Eyes: PERRL. No sclera icterus. No conjunctival injection. ENT: No discharge. Pharynx clear. Neck: Trachea midline. No obvious mass. Resp:scattered rhonchibilateral   CV: Regular rate. Regular rhythm. No murmur or rub. No edema. GI: Non-tender. Non-distended. No hernia. Skin: Warm and dry. No nodule on exposed extremities. Lymph: No cervical LAD. No supraclavicular LAD. M/S: No cyanosis. No joint deformity. No clubbing. Neuro: Awake. Alert. Moves all four extremities. Psych: Oriented x 3. No anxiety.              LABS/IMAGING:    CBC:  Lab Results   Component Value Date    WBC 2.8 (L) 01/04/2023    HGB 11.4 (L) 01/04/2023    HCT 33.6 (L) 01/04/2023    MCV 95.8 01/04/2023     01/04/2023    LYMPHOPCT 23.0 01/04/2023    RBC 3.51 (L) 01/04/2023    MCH 32.6 01/04/2023    MCHC 34.0 01/04/2023    RDW 18.2 (H) 01/04/2023    NEUTOPHILPCT 53.0 01/04/2023    MONOPCT 22.0 01/04/2023    BASOPCT 0.0 01/04/2023    NEUTROABS 1.5 (L) 01/04/2023    LYMPHSABS 0.6 (L) 01/04/2023    MONOSABS 0.6 01/04/2023    EOSABS 0.0 01/04/2023    BASOSABS 0.0 01/04/2023       Recent Labs     01/04/23  0518 01/03/23  1255   WBC 2.8* 2.8*   HGB 11.4* 12.4*   HCT 33.6* 38.0*   MCV 95.8 97.4    187       BMP:   Recent Labs     01/03/23  1255 01/04/23  0518    139   K 3.4* 4.2    104   CO2 29 24   BUN 11 8   CREATININE 0.7* <0.5*       MG:   Lab Results   Component Value Date/Time    MG 1.70 01/03/2023 12:55 PM     Ca/Phos:   Lab Results   Component Value Date    CALCIUM 7.9 (L) 01/04/2023     LIVER PROFILE:   Recent Labs     01/03/23  1255 01/04/23  0518   AST 45* 28   ALT 35 26   BILITOT 0.6 0.5   ALKPHOS 117 100       PT/INR: No results for input(s): PROTIME, INR in the last 72 hours. APTT: No results for input(s): APTT in the last 72 hours. MV Settings:     / / /     IV:   sodium chloride             Medications:  Scheduled Meds:   sodium chloride flush  5-40 mL IntraVENous 2 times per day    enoxaparin  30 mg SubCUTAneous BID    dexamethasone  6 mg IntraVENous Q24H    pantoprazole  40 mg Oral Daily    gabapentin  300 mg Oral TID    albuterol-ipratropium  1 puff Inhalation 4x daily       PRN Meds:  sodium chloride flush, sodium chloride, ondansetron **OR** ondansetron, polyethylene glycol, acetaminophen **OR** acetaminophen, potassium chloride **OR** potassium alternative oral replacement **OR** potassium chloride, magnesium sulfate, albuterol-ipratropium    Results:  CBC:   Recent Labs     01/03/23  1255 01/04/23  0518   WBC 2.8* 2.8*   HGB 12.4* 11.4*   HCT 38.0* 33.6*   MCV 97.4 95.8    180     BMP:   Recent Labs     01/03/23  1255 01/04/23  0518    139   K 3.4* 4.2    104   CO2 29 24   BUN 11 8   CREATININE 0.7* <0.5*     LIVER PROFILE:   Recent Labs     01/03/23  1255 01/04/23  0518   AST 45* 28   ALT 35 26   BILITOT 0.6 0.5   ALKPHOS 117 100     PT/INR: No results for input(s): PROTIME, INR in the last 72 hours. APTT: No results for input(s): APTT in the last 72 hours. UA:No results for input(s): NITRITE, COLORU, PHUR, LABCAST, WBCUA, RBCUA, MUCUS, TRICHOMONAS, YEAST, BACTERIA, CLARITYU, SPECGRAV, LEUKOCYTESUR, UROBILINOGEN, BILIRUBINUR, BLOODU, GLUCOSEU, AMORPHOUS in the last 72 hours.     Invalid input(s): Palmer [place]    Cultures:      Films:  CXR reviewed by me and it showed 3cavitary lesion right upper lobe    Assessment:       -COVID-pneumonia  -Acute respiratory failure hypoxic/hyper Secondary  -Acute COPD  -History of lung cancer non-small cell status post chemo right  -Metastatic non-small cell lung cancer      Plan:      *-Patient is 75-year-old with history of COPD lung cancer status post chemoradiation just finished recently came in with COVID-pneumonia along with leukopenia    CAT scan with minimal changes regarding COVID however he still that cavitary lesions  *-We will wean his oxygen  *-Agree with Decadron 6 mg daily  *-Giving the patient stability, leukopenia and recent chemoradiation I will hold on Tosi  *_Avoid fluid overload  *-Watch for secondary infection  *-DVT  Discussed win  details with his wife  We will follow along with you    In terms of his lung cancer he is status post chemoradiation so there is no further work-up from the pulmonary*      Thank you very much for allowing me to participate in the care of this pleasant patient , should you have any questions ,please do not hesitate to contact me      Salas Rather [facility]  Pulmonary&Critical Care Medicine   Professor Sherri Hernandez

## 2023-01-05 PROBLEM — J96.21 ACUTE ON CHRONIC RESPIRATORY FAILURE WITH HYPOXEMIA (HCC): Status: ACTIVE | Noted: 2023-01-05

## 2023-01-05 LAB
A/G RATIO: 1.4 (ref 1.1–2.2)
ALBUMIN SERPL-MCNC: 3.4 G/DL (ref 3.4–5)
ALP BLD-CCNC: 101 U/L (ref 40–129)
ALT SERPL-CCNC: 21 U/L (ref 10–40)
ANION GAP SERPL CALCULATED.3IONS-SCNC: 10 MMOL/L (ref 3–16)
AST SERPL-CCNC: 22 U/L (ref 15–37)
BASOPHILS ABSOLUTE: 0 K/UL (ref 0–0.2)
BASOPHILS RELATIVE PERCENT: 0.1 %
BILIRUB SERPL-MCNC: 0.5 MG/DL (ref 0–1)
BUN BLDV-MCNC: 11 MG/DL (ref 7–20)
CALCIUM SERPL-MCNC: 8.2 MG/DL (ref 8.3–10.6)
CHLORIDE BLD-SCNC: 104 MMOL/L (ref 99–110)
CO2: 25 MMOL/L (ref 21–32)
CREAT SERPL-MCNC: <0.5 MG/DL (ref 0.8–1.3)
EOSINOPHILS ABSOLUTE: 0 K/UL (ref 0–0.6)
EOSINOPHILS RELATIVE PERCENT: 0 %
GFR SERPL CREATININE-BSD FRML MDRD: >60 ML/MIN/{1.73_M2}
GLUCOSE BLD-MCNC: 110 MG/DL (ref 70–99)
HCT VFR BLD CALC: 34.1 % (ref 40.5–52.5)
HEMOGLOBIN: 11.5 G/DL (ref 13.5–17.5)
LYMPHOCYTES ABSOLUTE: 1.2 K/UL (ref 1–5.1)
LYMPHOCYTES RELATIVE PERCENT: 36.1 %
MCH RBC QN AUTO: 32.6 PG (ref 26–34)
MCHC RBC AUTO-ENTMCNC: 33.7 G/DL (ref 31–36)
MCV RBC AUTO: 96.7 FL (ref 80–100)
MONOCYTES ABSOLUTE: 0.7 K/UL (ref 0–1.3)
MONOCYTES RELATIVE PERCENT: 20.7 %
NEUTROPHILS ABSOLUTE: 1.5 K/UL (ref 1.7–7.7)
NEUTROPHILS RELATIVE PERCENT: 43.1 %
PDW BLD-RTO: 18.2 % (ref 12.4–15.4)
PLATELET # BLD: 204 K/UL (ref 135–450)
PMV BLD AUTO: 8.7 FL (ref 5–10.5)
POTASSIUM REFLEX MAGNESIUM: 4.3 MMOL/L (ref 3.5–5.1)
RBC # BLD: 3.53 M/UL (ref 4.2–5.9)
SODIUM BLD-SCNC: 139 MMOL/L (ref 136–145)
TOTAL PROTEIN: 5.9 G/DL (ref 6.4–8.2)
WBC # BLD: 3.4 K/UL (ref 4–11)

## 2023-01-05 PROCEDURE — 6360000002 HC RX W HCPCS: Performed by: INTERNAL MEDICINE

## 2023-01-05 PROCEDURE — 2580000003 HC RX 258

## 2023-01-05 PROCEDURE — 85025 COMPLETE CBC W/AUTO DIFF WBC: CPT

## 2023-01-05 PROCEDURE — 6360000002 HC RX W HCPCS

## 2023-01-05 PROCEDURE — 99233 SBSQ HOSP IP/OBS HIGH 50: CPT | Performed by: INTERNAL MEDICINE

## 2023-01-05 PROCEDURE — 6370000000 HC RX 637 (ALT 250 FOR IP): Performed by: INTERNAL MEDICINE

## 2023-01-05 PROCEDURE — 1200000000 HC SEMI PRIVATE

## 2023-01-05 PROCEDURE — 94640 AIRWAY INHALATION TREATMENT: CPT

## 2023-01-05 PROCEDURE — 2700000000 HC OXYGEN THERAPY PER DAY

## 2023-01-05 PROCEDURE — 80053 COMPREHEN METABOLIC PANEL: CPT

## 2023-01-05 PROCEDURE — 6370000000 HC RX 637 (ALT 250 FOR IP)

## 2023-01-05 PROCEDURE — 36415 COLL VENOUS BLD VENIPUNCTURE: CPT

## 2023-01-05 PROCEDURE — 94761 N-INVAS EAR/PLS OXIMETRY MLT: CPT

## 2023-01-05 RX ORDER — LORAZEPAM 0.5 MG/1
0.5 TABLET ORAL ONCE
Status: COMPLETED | OUTPATIENT
Start: 2023-01-05 | End: 2023-01-05

## 2023-01-05 RX ORDER — DRONABINOL 2.5 MG/1
5 CAPSULE ORAL 2 TIMES DAILY
Status: DISCONTINUED | OUTPATIENT
Start: 2023-01-05 | End: 2023-01-09 | Stop reason: HOSPADM

## 2023-01-05 RX ORDER — DEXAMETHASONE SODIUM PHOSPHATE 10 MG/ML
6 INJECTION, SOLUTION INTRAMUSCULAR; INTRAVENOUS EVERY 12 HOURS
Status: DISCONTINUED | OUTPATIENT
Start: 2023-01-05 | End: 2023-01-06

## 2023-01-05 RX ADMIN — GABAPENTIN 300 MG: 300 CAPSULE ORAL at 15:39

## 2023-01-05 RX ADMIN — Medication 10 ML: at 20:44

## 2023-01-05 RX ADMIN — ENOXAPARIN SODIUM 30 MG: 100 INJECTION SUBCUTANEOUS at 08:35

## 2023-01-05 RX ADMIN — GABAPENTIN 300 MG: 300 CAPSULE ORAL at 08:35

## 2023-01-05 RX ADMIN — ENOXAPARIN SODIUM 30 MG: 100 INJECTION SUBCUTANEOUS at 20:44

## 2023-01-05 RX ADMIN — LORAZEPAM 0.5 MG: 0.5 TABLET ORAL at 18:20

## 2023-01-05 RX ADMIN — GABAPENTIN 300 MG: 300 CAPSULE ORAL at 20:44

## 2023-01-05 RX ADMIN — DRONABINOL 5 MG: 2.5 CAPSULE ORAL at 15:39

## 2023-01-05 RX ADMIN — DEXAMETHASONE SODIUM PHOSPHATE 6 MG: 10 INJECTION, SOLUTION INTRAMUSCULAR; INTRAVENOUS at 15:39

## 2023-01-05 RX ADMIN — Medication 10 ML: at 08:35

## 2023-01-05 RX ADMIN — DRONABINOL 5 MG: 2.5 CAPSULE ORAL at 20:44

## 2023-01-05 RX ADMIN — PANTOPRAZOLE SODIUM 40 MG: 40 TABLET, DELAYED RELEASE ORAL at 08:35

## 2023-01-05 NOTE — CONSULTS
P Pulmonary, Critical Care [facility] Sleep Specialists                                 Pulmonary Consult /Progress Note :                                                                  CHIEF COMPLAINT: SOB       HPI:     He report some SOB ,not much better  On 4 L  No fever Or chills  No chest Pain   Some cough    Objective:   PHYSICAL EXAM:    Blood pressure 99/65, pulse 93, temperature 97 °F (36.1 °C), temperature source Oral, resp. rate 18, height 5' 11\" (1.803 m), weight 150 lb (68 kg), SpO2 90 %.' on RA  Gen: No distress. Eyes: PERRL. No sclera icterus. No conjunctival injection. ENT: No discharge. Pharynx clear. Neck: Trachea midline. No obvious mass. Resp:scattered rhonchibilateral   CV: Regular rate. Regular rhythm. No murmur or rub. No edema. GI: Non-tender. Non-distended. No hernia. Skin: Warm and dry. No nodule on exposed extremities. Lymph: No cervical LAD. No supraclavicular LAD. M/S: No cyanosis. No joint deformity. No clubbing. Neuro: Awake. Alert. Moves all four extremities. Psych: Oriented x 3. No anxiety.              LABS/IMAGING:    CBC:  Lab Results   Component Value Date    WBC 3.4 (L) 01/05/2023    HGB 11.5 (L) 01/05/2023    HCT 34.1 (L) 01/05/2023    MCV 96.7 01/05/2023     01/05/2023    LYMPHOPCT 36.1 01/05/2023    RBC 3.53 (L) 01/05/2023    MCH 32.6 01/05/2023    MCHC 33.7 01/05/2023    RDW 18.2 (H) 01/05/2023    NEUTOPHILPCT 43.1 01/05/2023    MONOPCT 20.7 01/05/2023    BASOPCT 0.1 01/05/2023    NEUTROABS 1.5 (L) 01/05/2023    LYMPHSABS 1.2 01/05/2023    MONOSABS 0.7 01/05/2023    EOSABS 0.0 01/05/2023    BASOSABS 0.0 01/05/2023       Recent Labs     01/05/23  0544 01/04/23  0518 01/03/23  1255   WBC 3.4* 2.8* 2.8*   HGB 11.5* 11.4* 12.4*   HCT 34.1* 33.6* 38.0*   MCV 96.7 95.8 97.4    180 187         BMP:   Recent Labs     01/03/23  1255 01/04/23  0518 01/05/23  0544    139 139   K 3.4* 4.2 4.3    104 104   CO2 29 24 25 BUN 11 8 11   CREATININE 0.7* <0.5* <0.5*         MG:   Lab Results   Component Value Date/Time    MG 1.70 01/03/2023 12:55 PM     Ca/Phos:   Lab Results   Component Value Date    CALCIUM 8.2 (L) 01/05/2023     LIVER PROFILE:   Recent Labs     01/03/23  1255 01/04/23  0518 01/05/23  0544   AST 45* 28 22   ALT 35 26 21   BILITOT 0.6 0.5 0.5   ALKPHOS 117 100 101         PT/INR: No results for input(s): PROTIME, INR in the last 72 hours. APTT: No results for input(s): APTT in the last 72 hours. MV Settings:     / / /     IV:   sodium chloride             Medications:  Scheduled Meds:   sodium chloride flush  5-40 mL IntraVENous 2 times per day    enoxaparin  30 mg SubCUTAneous BID    dexamethasone  6 mg IntraVENous Q24H    pantoprazole  40 mg Oral Daily    gabapentin  300 mg Oral TID    albuterol-ipratropium  1 puff Inhalation 4x daily       PRN Meds:  sodium chloride flush, sodium chloride, ondansetron **OR** ondansetron, polyethylene glycol, acetaminophen **OR** acetaminophen, potassium chloride **OR** potassium alternative oral replacement **OR** potassium chloride, magnesium sulfate, albuterol-ipratropium    Results:  CBC:   Recent Labs     01/03/23  1255 01/04/23  0518 01/05/23  0544   WBC 2.8* 2.8* 3.4*   HGB 12.4* 11.4* 11.5*   HCT 38.0* 33.6* 34.1*   MCV 97.4 95.8 96.7    180 204       BMP:   Recent Labs     01/03/23  1255 01/04/23  0518 01/05/23  0544    139 139   K 3.4* 4.2 4.3    104 104   CO2 29 24 25   BUN 11 8 11   CREATININE 0.7* <0.5* <0.5*       LIVER PROFILE:   Recent Labs     01/03/23  1255 01/04/23  0518 01/05/23  0544   AST 45* 28 22   ALT 35 26 21   BILITOT 0.6 0.5 0.5   ALKPHOS 117 100 101       PT/INR: No results for input(s): PROTIME, INR in the last 72 hours. APTT: No results for input(s): APTT in the last 72 hours.   UA:No results for input(s): NITRITE, COLORU, PHUR, LABCAST, WBCUA, RBCUA, MUCUS, TRICHOMONAS, YEAST, BACTERIA, CLARITYU, SPECGRAV, LEUKOCYTESUR, UROBILINOGEN, BILIRUBINUR, BLOODU, GLUCOSEU, AMORPHOUS in the last 72 hours.     Invalid input(s): Taty Guidry    Cultures:      Films:  CXR reviewed by me and it showed 3cavitary lesion right upper lobe    Assessment:       -COVID-pneumonia  -Acute respiratory failure hypoxic/hyper Secondary  -Acute COPD  -History of lung cancer non-small cell status post chemo right  -Metastatic non-small cell lung cancer      Plan:      *-Patient is 75-year-old with history of COPD lung cancer status post chemoradiation just finished recently came in with COVID-pneumonia along with leukopenia    CAT scan with minimal changes regarding COVID however he still that cavitary lesions  *-We will wean his oxygen  *increase Decadron 6 mg bid  *-CXR   *-Giving the patient stability, leukopenia and recent chemoradiation I will hold on Tosi and observe   *_Avoid fluid overload  *-Watch for secondary infection,check proca  *-DVT    We will follow along with you    In terms of his lung cancer he is status post chemoradiation so there is no further work-up from the pulmonary*      Thank you very much for allowing me to participate in the care of this pleasant patient , should you have any questions ,please do not hesitate to contact me      Andrzej Felix MD,[facility]  Pulmonary&Critical Care Medicine    Michael Rodriguez

## 2023-01-05 NOTE — PLAN OF CARE
Problem: Discharge Planning  Goal: Discharge to home or other facility with appropriate resources  1/5/2023 1100 by Mae Rivera RN  Outcome: Progressing  1/4/2023 2201 by Maryjane Richardson RN  Outcome: Progressing     Problem: Safety - Adult  Goal: Free from fall injury  1/5/2023 1100 by Mae Rivera RN  Outcome: Progressing  1/4/2023 2201 by Maryjane Richardson RN  Outcome: Progressing     Problem: Nutrition Deficit:  Goal: Optimize nutritional status  Outcome: Progressing

## 2023-01-05 NOTE — PROGRESS NOTES
Bedside report given to Rae RN  pt in stable condition no needs at this time.  Call light within reach

## 2023-01-05 NOTE — PROGRESS NOTES
Perfect Serve  1/5/23 4:43 PM  [phone] Hospital or Facility: [facility] From: Alicia Mehta RE: Gloria Marti 1950 RM: [id number] Pt states that he needs something for his \"nerves\". He has something where his jaw/tongue tremors and he said it gets far worse with anxiety and it is currently to the point where it is getting sore. Need Callback: NO CALLBACK REQ 2 [facility] ESTABLISHED PATIENTS FYI  Read 5:03 PM     Dr Shelia Donaldson responded via telephone. Writer contacted Patient's pharmacy after he stated that he takes something at home for his nerves and there is nothing that his pharmacy has filled for anxiety. Pt stated that it is a \"little yellow pill\". [facility] pharmacy stated that he takes Marinol (which matches this description and could potentially have calming effects) however this is ordered for appetite and the patient has already had it prior to asking for something for Anxiety. MD provided verbal orders, see new orders.

## 2023-01-05 NOTE — FLOWSHEET NOTE
01/04/23 1934   Vital Signs   Temp 98.4 °F (36.9 °C)   Temp Source Oral   Heart Rate 68   Heart Rate Source Monitor   Resp 18   /67   MAP (Calculated) 79   MAP (mmHg) 79   BP Location Right upper arm   BP Method Automatic   Patient Position High fowlers   Level of Consciousness 0   MEWS Score 1   Oxygen Therapy   SpO2 91 %   O2 Device Nasal cannula   O2 Flow Rate (L/min) 4 L/min   Pt A/O assessment completed. Meds given per MAR. Pt denies any needs at this time.  Call light within reach and bed alarm on

## 2023-01-05 NOTE — FLOWSHEET NOTE
01/05/23 0815   Vital Signs   Temp 97 °F (36.1 °C)   Temp Source Oral   Heart Rate 93   Heart Rate Source Monitor   Resp 18   BP 99/65   MAP (Calculated) 76   BP Location Right upper arm   BP Method Automatic   Patient Position Sitting   Level of Consciousness 0   MEWS Score 2   Pain Assessment   Pain Assessment None - Denies Pain   Oxygen Therapy   SpO2 92 %   Pulse Oximeter Device Mode Intermittent   Pulse Oximeter Device Location Finger   O2 Device Nasal cannula   O2 Flow Rate (L/min) 4 L/min   AM assessment completed, see flow sheet. Pt is alert and oriented. Vital signs are WNL. Pt agitated and wants to go home, pt educated on disease process and potential complications as well as purposes for hospitalization. Pt states that he understands. Respirations are even & easy. No complaints voiced. Pt denies needs at this time. SR up x 2, and bed in low position. Call light is within reach.

## 2023-01-05 NOTE — PROGRESS NOTES
IM Progress Note    Admit Date:  1/3/2023    Patient with history of COPD and lung cancer presenting with increased shortness of breath, admitted for exacerbation of COPD, COVID-positive. Patient normally on oxygen 1 to 2 L, required O2 up to 3 L on presentation    Subjective:  Mr. Shanice Morgan has worsening hypoxemia today with O2 requirements up to 4 L . Complains of cough with minimal frothy sputum   complains of shortness of breath . No fevers or chills . Feels anxious       Objective:   BP 99/65   Pulse 93   Temp 97 °F (36.1 °C) (Oral)   Resp 18   Ht 5' 11\" (1.803 m)   Wt 150 lb (68 kg)   SpO2 90%   BMI 20.92 kg/m²     Intake/Output Summary (Last 24 hours) at 1/5/2023 1326  Last data filed at 1/5/2023 0211  Gross per 24 hour   Intake --   Output 250 ml   Net -250 ml           Physical Exam:  General:  Awake, alert, NAD  Skin:  Warm and dry  Neck:  JVD absent. Neck supple  Chest: Diminished breath sounds. Mild wheezes , no rales or rhonchi. Cardiovascular:  RRR ,S1S2 normal  Abdomen:  Soft, non tender, non distended, BS +  Extremities:  No edema. Intact peripheral pulses.  Brisk cap refill, < 2 secs  Neuro: non focal      Medications:   Scheduled Meds:   sodium chloride flush  5-40 mL IntraVENous 2 times per day    enoxaparin  30 mg SubCUTAneous BID    dexamethasone  6 mg IntraVENous Q24H    pantoprazole  40 mg Oral Daily    gabapentin  300 mg Oral TID    albuterol-ipratropium  1 puff Inhalation 4x daily       Continuous Infusions:   sodium chloride         Data:  CBC:   Recent Labs     01/03/23  1255 01/04/23 0518 01/05/23  0544   WBC 2.8* 2.8* 3.4*   RBC 3.90* 3.51* 3.53*   HGB 12.4* 11.4* 11.5*   HCT 38.0* 33.6* 34.1*   MCV 97.4 95.8 96.7   RDW 18.2* 18.2* 18.2*    180 204       BMP:   Recent Labs     01/03/23  1255 01/04/23  0518 01/05/23  0544    139 139   K 3.4* 4.2 4.3    104 104   CO2 29 24 25   BUN 11 8 11   CREATININE 0.7* <0.5* <0.5*       BNP: No results for input(s): BNP in the last 72 hours. PT/INR: No results for input(s): PROTIME, INR in the last 72 hours. APTT: No results for input(s): APTT in the last 72 hours. CARDIAC ENZYMES:   Recent Labs     01/03/23  1255   TROPONINI <0.01       FASTING LIPID PANEL:No results found for: CHOL, HDL, TRIG  LIVER PROFILE:   Recent Labs     01/03/23  1255 01/04/23  0518 01/05/23  0544   AST 45* 28 22   ALT 35 26 21   BILITOT 0.6 0.5 0.5   ALKPHOS 117 100 101          Cultures  Covid positive  Influenza not detected        Radiology  CT CHEST PULMONARY EMBOLISM W CONTRAST   Final Result   1. Negative for pulmonary embolus. 2. Cavitary 6 cm right upper lobe bronchogenic carcinoma with associated   rightward T2, T3 and T4 vertebral body/costal invasion. 3. Moderate to severe pathologic T3 compression fracture. Assessment:  Principal Problem:    Acute on chronic respiratory failure (HCC)  Active Problems:    COPD (chronic obstructive pulmonary disease) (HCC)    Lung cancer (HCC)    COVID-19    Hypomagnesemia    Hypokalemia    Acute on chronic respiratory failure with hypoxemia ([facility])  Resolved Problems:    * No resolved hospital problems. *      Plan:    #Acute hypoxic respiratory failure  #COPD AE  #COVID  -presented with increasing SOB and productive cough x1 week  -baseline O2 requirement 1L; currently on 3->4L NC; wean as tolerated  -CTPA negative   -decadron and combivent - cont  -droplet plus precautions  -pulmonology consulted   Currently requiring  4L of oxygen;  continue to wean O2 as tolerated at baseline. Patient is on oxygen 1 to 2 L at baseline. worsening hypoxemia now, consider Tocilizumab -> will discuss with pulmonology. Discussed with pulmonology, hold off on Tocilizumab as patient is immunosuppressed and leukopenic.   Will increase dose of Decadron from once daily to 6 mg IV every 12 hours     #Lung cancer  -s/p chemo and radiation  -follows w/ [facility]     #Hypomagnesemia  #Hypokalemia  -replete   -monitor   #HTN  -BP low on admission  -hold lisinopril and amlodipine for now      #GERD  -continue PPI     # Anxiety   -Check home medication  -Ativan x1 dose     DVT Prophylaxis: Lovenox  ADULT DIET; Regular  ADULT ORAL NUTRITION SUPPLEMENT; Breakfast, Lunch, Dinner; Standard High Calorie/High Protein Oral Supplement   Full Code    Total time today 38 minutes. > 50 % time dominated by coordination of care         Darnell Carrasco MD   1/5/2023 1:26 PM

## 2023-01-05 NOTE — PROGRESS NOTES
RT Inhaler-Nebulizer Bronchodilator Protocol Note    There is a bronchodilator order in the chart from a provider indicating to follow the RT Bronchodilator Protocol and there is an Initiate RT Inhaler-Nebulizer Bronchodilator Protocol order as well (see protocol at bottom of note). CXR Findings:  No results found. The findings from the last RT Protocol Assessment were as follows:   History Pulmonary Disease: Chronic pulmonary disease  Respiratory Pattern: Dyspnea on exertion or RR 21-25 bpm  Breath Sounds: Slightly diminished and/or crackles  Cough: Strong, spontaneous, non-productive  Indication for Bronchodilator Therapy: Decreased or absent breath sounds  Bronchodilator Assessment Score: 6    Aerosolized bronchodilator medication orders have been revised according to the RT Inhaler-Nebulizer Bronchodilator Protocol below. Respiratory Therapist to perform RT Therapy Protocol Assessment initially then follow the protocol. Repeat RT Therapy Protocol Assessment PRN for score 0-3 or on second treatment, BID, and PRN for scores above 3. No Indications - adjust the frequency to every 6 hours PRN wheezing or bronchospasm, if no treatments needed after 48 hours then discontinue using Per Protocol order mode. If indication present, adjust the RT bronchodilator orders based on the Bronchodilator Assessment Score as indicated below. Use Inhaler orders unless patient has one or more of the following: on home nebulizer, not able to hold breath for 10 seconds, is not alert and oriented, cannot activate and use MDI correctly, or respiratory rate 25 breaths per minute or more, then use the equivalent nebulizer order(s) with same Frequency and PRN reasons based on the score. If a patient is on this medication at home then do not decrease Frequency below that used at home.     0-3 - enter or revise RT bronchodilator order(s) to equivalent RT Bronchodilator order with Frequency of every 4 hours PRN for wheezing or increased work of breathing using Per Protocol order mode. 4-6 - enter or revise RT Bronchodilator order(s) to two equivalent RT bronchodilator orders with one order with BID Frequency and one order with Frequency of every 4 hours PRN wheezing or increased work of breathing using Per Protocol order mode. 7-10 - enter or revise RT Bronchodilator order(s) to two equivalent RT bronchodilator orders with one order with TID Frequency and one order with Frequency of every 4 hours PRN wheezing or increased work of breathing using Per Protocol order mode. 11-13 - enter or revise RT Bronchodilator order(s) to one equivalent RT bronchodilator order with QID Frequency and an Albuterol order with Frequency of every 4 hours PRN wheezing or increased work of breathing using Per Protocol order mode. Greater than 13 - enter or revise RT Bronchodilator order(s) to one equivalent RT bronchodilator order with every 4 hours Frequency and an Albuterol order with Frequency of every 2 hours PRN wheezing or increased work of breathing using Per Protocol order mode. PATIENT WILL BENEFIT FROM TREATMENTS.      Electronically signed by Letty Castro RCP on 1/4/2023 at 7:03 PM

## 2023-01-06 ENCOUNTER — APPOINTMENT (OUTPATIENT)
Dept: GENERAL RADIOLOGY | Age: 73
DRG: 177 | End: 2023-01-06
Payer: OTHER GOVERNMENT

## 2023-01-06 LAB
A/G RATIO: 1.2 (ref 1.1–2.2)
ALBUMIN SERPL-MCNC: 3.6 G/DL (ref 3.4–5)
ALP BLD-CCNC: 105 U/L (ref 40–129)
ALT SERPL-CCNC: 27 U/L (ref 10–40)
ANION GAP SERPL CALCULATED.3IONS-SCNC: 10 MMOL/L (ref 3–16)
AST SERPL-CCNC: 30 U/L (ref 15–37)
BASOPHILS ABSOLUTE: 0 K/UL (ref 0–0.2)
BASOPHILS RELATIVE PERCENT: 0.1 %
BILIRUB SERPL-MCNC: 0.5 MG/DL (ref 0–1)
BUN BLDV-MCNC: 12 MG/DL (ref 7–20)
C-REACTIVE PROTEIN: 10.8 MG/L (ref 0–5.1)
CALCIUM SERPL-MCNC: 8.6 MG/DL (ref 8.3–10.6)
CHLORIDE BLD-SCNC: 104 MMOL/L (ref 99–110)
CO2: 26 MMOL/L (ref 21–32)
CREAT SERPL-MCNC: <0.5 MG/DL (ref 0.8–1.3)
EOSINOPHILS ABSOLUTE: 0 K/UL (ref 0–0.6)
EOSINOPHILS RELATIVE PERCENT: 0 %
GFR SERPL CREATININE-BSD FRML MDRD: >60 ML/MIN/{1.73_M2}
GLUCOSE BLD-MCNC: 136 MG/DL (ref 70–99)
HCT VFR BLD CALC: 36.7 % (ref 40.5–52.5)
HEMOGLOBIN: 12.3 G/DL (ref 13.5–17.5)
LYMPHOCYTES ABSOLUTE: 1.3 K/UL (ref 1–5.1)
LYMPHOCYTES RELATIVE PERCENT: 34.2 %
MCH RBC QN AUTO: 32.4 PG (ref 26–34)
MCHC RBC AUTO-ENTMCNC: 33.6 G/DL (ref 31–36)
MCV RBC AUTO: 96.4 FL (ref 80–100)
MONOCYTES ABSOLUTE: 0.5 K/UL (ref 0–1.3)
MONOCYTES RELATIVE PERCENT: 12.7 %
NEUTROPHILS ABSOLUTE: 2.1 K/UL (ref 1.7–7.7)
NEUTROPHILS RELATIVE PERCENT: 53 %
PDW BLD-RTO: 18.4 % (ref 12.4–15.4)
PLATELET # BLD: 201 K/UL (ref 135–450)
PMV BLD AUTO: 9.4 FL (ref 5–10.5)
POTASSIUM REFLEX MAGNESIUM: 4.3 MMOL/L (ref 3.5–5.1)
PROCALCITONIN: 0.08 NG/ML (ref 0–0.15)
RBC # BLD: 3.81 M/UL (ref 4.2–5.9)
SODIUM BLD-SCNC: 140 MMOL/L (ref 136–145)
TOTAL PROTEIN: 6.5 G/DL (ref 6.4–8.2)
WBC # BLD: 3.9 K/UL (ref 4–11)

## 2023-01-06 PROCEDURE — 99232 SBSQ HOSP IP/OBS MODERATE 35: CPT | Performed by: INTERNAL MEDICINE

## 2023-01-06 PROCEDURE — 6360000002 HC RX W HCPCS

## 2023-01-06 PROCEDURE — 94150 VITAL CAPACITY TEST: CPT

## 2023-01-06 PROCEDURE — 84145 PROCALCITONIN (PCT): CPT

## 2023-01-06 PROCEDURE — 71045 X-RAY EXAM CHEST 1 VIEW: CPT

## 2023-01-06 PROCEDURE — 2700000000 HC OXYGEN THERAPY PER DAY

## 2023-01-06 PROCEDURE — 2580000003 HC RX 258

## 2023-01-06 PROCEDURE — 80053 COMPREHEN METABOLIC PANEL: CPT

## 2023-01-06 PROCEDURE — 6360000002 HC RX W HCPCS: Performed by: INTERNAL MEDICINE

## 2023-01-06 PROCEDURE — 1200000000 HC SEMI PRIVATE

## 2023-01-06 PROCEDURE — 6370000000 HC RX 637 (ALT 250 FOR IP)

## 2023-01-06 PROCEDURE — 94761 N-INVAS EAR/PLS OXIMETRY MLT: CPT

## 2023-01-06 PROCEDURE — 6370000000 HC RX 637 (ALT 250 FOR IP): Performed by: INTERNAL MEDICINE

## 2023-01-06 PROCEDURE — 36415 COLL VENOUS BLD VENIPUNCTURE: CPT

## 2023-01-06 PROCEDURE — 86140 C-REACTIVE PROTEIN: CPT

## 2023-01-06 PROCEDURE — 94640 AIRWAY INHALATION TREATMENT: CPT

## 2023-01-06 PROCEDURE — 85025 COMPLETE CBC W/AUTO DIFF WBC: CPT

## 2023-01-06 RX ORDER — DEXAMETHASONE SODIUM PHOSPHATE 4 MG/ML
4 INJECTION, SOLUTION INTRA-ARTICULAR; INTRALESIONAL; INTRAMUSCULAR; INTRAVENOUS; SOFT TISSUE EVERY 12 HOURS
Status: DISCONTINUED | OUTPATIENT
Start: 2023-01-06 | End: 2023-01-09 | Stop reason: HOSPADM

## 2023-01-06 RX ORDER — LORAZEPAM 0.5 MG/1
0.5 TABLET ORAL EVERY 6 HOURS PRN
Status: DISCONTINUED | OUTPATIENT
Start: 2023-01-06 | End: 2023-01-09 | Stop reason: HOSPADM

## 2023-01-06 RX ADMIN — LORAZEPAM 0.5 MG: 0.5 TABLET ORAL at 15:08

## 2023-01-06 RX ADMIN — DEXAMETHASONE SODIUM PHOSPHATE 6 MG: 10 INJECTION, SOLUTION INTRAMUSCULAR; INTRAVENOUS at 01:59

## 2023-01-06 RX ADMIN — PANTOPRAZOLE SODIUM 40 MG: 40 TABLET, DELAYED RELEASE ORAL at 09:16

## 2023-01-06 RX ADMIN — ENOXAPARIN SODIUM 30 MG: 100 INJECTION SUBCUTANEOUS at 09:16

## 2023-01-06 RX ADMIN — GABAPENTIN 300 MG: 300 CAPSULE ORAL at 09:16

## 2023-01-06 RX ADMIN — Medication 10 ML: at 20:38

## 2023-01-06 RX ADMIN — LORAZEPAM 0.5 MG: 0.5 TABLET ORAL at 21:39

## 2023-01-06 RX ADMIN — LORAZEPAM 0.5 MG: 0.5 TABLET ORAL at 09:44

## 2023-01-06 RX ADMIN — GABAPENTIN 300 MG: 300 CAPSULE ORAL at 20:38

## 2023-01-06 RX ADMIN — DRONABINOL 5 MG: 2.5 CAPSULE ORAL at 09:16

## 2023-01-06 RX ADMIN — GABAPENTIN 300 MG: 300 CAPSULE ORAL at 15:08

## 2023-01-06 RX ADMIN — DEXAMETHASONE SODIUM PHOSPHATE 4 MG: 4 INJECTION, SOLUTION INTRAMUSCULAR; INTRAVENOUS at 13:27

## 2023-01-06 RX ADMIN — ENOXAPARIN SODIUM 30 MG: 100 INJECTION SUBCUTANEOUS at 20:38

## 2023-01-06 RX ADMIN — DRONABINOL 5 MG: 2.5 CAPSULE ORAL at 20:38

## 2023-01-06 NOTE — CARE COORDINATION
INTERDISCIPLINARY PLAN OF CARE CONFERENCE    Date/Time: 1/6/2023 4:26 PM  Completed by: Samantha Mae RN, Case Management      Patient Name:  Mary Soares  YOB: 1950  Admitting Diagnosis: Hypokalemia [E87.6]  Hypomagnesemia [E83.42]  Acute on chronic respiratory failure ([address] [J96.20]  COVID-19 [U07.1]  Acute respiratory failure due to COVID-19 ([address] [U07.1, J96.00]     Admit Date/Time:  1/3/2023 11:34 AM    Chart reviewed. Interdisciplinary team contacted or reviewed plan related to patient progress and discharge plans. Disciplines included Case Management, Nursing, and Dietitian. Current Status:Stable  PT/OT recommendation for discharge plan of care: Pending    Expected D/C Disposition:  Home  Confirmed plan with patient  Yes     Met with:patient  Discharge Plan Comments: Reviewed chart and met with pt who cont plan for home at dc. Mariel Shah has ex wife and children who will stay with him and assist. Will request PT/OT eval. Will follow for poss increase in home O2 and HHC need at dc. Home O2 in place on admit: Yes  Pt informed of need to bring portable home O2 tank on day of discharge for nursing to connect prior to leaving:  Yes  Verbalized agreement/Understanding:   Yes

## 2023-01-06 NOTE — PROGRESS NOTES
Physical /Occupational Therapy  Received PT/OT referral. Upon entrance to room, patient noted to be eating dinner. Will re-attempt at patient and therapist time allows. Alessandro Indira, PT #[id number]   Carmen Orozco.  Opal Watson OTR/L #[id number]   No charge @17:00

## 2023-01-06 NOTE — PROGRESS NOTES
RT Inhaler-Nebulizer Bronchodilator Protocol Note    There is a bronchodilator order in the chart from a provider indicating to follow the RT Bronchodilator Protocol and there is an Initiate RT Inhaler-Nebulizer Bronchodilator Protocol order as well (see protocol at bottom of note). CXR Findings:  No results found. The findings from the last RT Protocol Assessment were as follows:   History Pulmonary Disease: Chronic pulmonary disease  Respiratory Pattern: Dyspnea on exertion or RR 21-25 bpm  Breath Sounds: Slightly diminished and/or crackles  Cough: Strong, spontaneous, non-productive  Indication for Bronchodilator Therapy: Decreased or absent breath sounds  Bronchodilator Assessment Score: 6    Aerosolized bronchodilator medication orders have been revised according to the RT Inhaler-Nebulizer Bronchodilator Protocol below. Respiratory Therapist to perform RT Therapy Protocol Assessment initially then follow the protocol. Repeat RT Therapy Protocol Assessment PRN for score 0-3 or on second treatment, BID, and PRN for scores above 3. No Indications - adjust the frequency to every 6 hours PRN wheezing or bronchospasm, if no treatments needed after 48 hours then discontinue using Per Protocol order mode. If indication present, adjust the RT bronchodilator orders based on the Bronchodilator Assessment Score as indicated below. Use Inhaler orders unless patient has one or more of the following: on home nebulizer, not able to hold breath for 10 seconds, is not alert and oriented, cannot activate and use MDI correctly, or respiratory rate 25 breaths per minute or more, then use the equivalent nebulizer order(s) with same Frequency and PRN reasons based on the score. If a patient is on this medication at home then do not decrease Frequency below that used at home.     0-3 - enter or revise RT bronchodilator order(s) to equivalent RT Bronchodilator order with Frequency of every 4 hours PRN for wheezing or increased work of breathing using Per Protocol order mode. 4-6 - enter or revise RT Bronchodilator order(s) to two equivalent RT bronchodilator orders with one order with BID Frequency and one order with Frequency of every 4 hours PRN wheezing or increased work of breathing using Per Protocol order mode. 7-10 - enter or revise RT Bronchodilator order(s) to two equivalent RT bronchodilator orders with one order with TID Frequency and one order with Frequency of every 4 hours PRN wheezing or increased work of breathing using Per Protocol order mode. 11-13 - enter or revise RT Bronchodilator order(s) to one equivalent RT bronchodilator order with QID Frequency and an Albuterol order with Frequency of every 4 hours PRN wheezing or increased work of breathing using Per Protocol order mode. Greater than 13 - enter or revise RT Bronchodilator order(s) to one equivalent RT bronchodilator order with every 4 hours Frequency and an Albuterol order with Frequency of every 2 hours PRN wheezing or increased work of breathing using Per Protocol order mode.        Electronically signed by Leotha Apley, RCP on 1/5/2023 at 7:06 PM

## 2023-01-06 NOTE — FLOWSHEET NOTE
01/06/23 0535   Oxygen Therapy   SpO2 (!) 89 %   O2 Device High flow nasal cannula   O2 Flow Rate (L/min) 7 L/min   Increased oxygen from 4 liters to high flow nasal cannula 7 liters RT called at this time for breathing treatment

## 2023-01-06 NOTE — PROGRESS NOTES
Comprehensive Nutrition Assessment    Type and Reason for Visit:  Reassess    Nutrition Recommendations/Plan:   Continue Regular dei  [employer]  Please obtain an actual body weight and document  Please record % intakes of melas and supps in flowsheets      Malnutrition Assessment:  Malnutrition Status: At risk for malnutrition (Comment) (01/04/23 1600)    Context:  Acute Illness     Findings of the 6 clinical characteristics of malnutrition:  Energy Intake:  Unable to assess  Weight Loss:  Unable to assess     Body Fat Loss:  Unable to assess     Muscle Mass Loss:  Unable to assess    Fluid Accumulation:  Unable to assess     Strength:  Not Performed    Nutrition Assessment:    Unable to assess for improvement or decline of nutritional status r/t lack of documentation ( no weights  or intakes ). Remains at risk for further nutritional compromise r/t + Covid infection and lung cancer with noted weight loss PTA . Will continue diet and supps as ordered      Nutrition Related Findings:    Pt A & O x 4 ; + BM today ;O2 @ 7 L this am and weaning to 5 ; + cough Wound Type: None       Current Nutrition Intake & Therapies:    Average Meal Intake: Unable to assess  Average Supplements Intake: Unable to assess  ADULT DIET; Regular  ADULT ORAL NUTRITION SUPPLEMENT; Breakfast, Lunch, Dinner; Standard High Calorie/High Protein Oral Supplement    Anthropometric Measures:  Height: 5' 11\" (180.3 cm)  Ideal Body Weight (IBW): 172 lbs (78 kg)    Admission Body Weight: 150 lb (68 kg)  Current Body Weight: 150 lb (68 kg), 87.2 % IBW.  Weight Source: Stated  Current BMI (kg/m2): 20.9  Usual Body Weight: 180 lb (81.6 kg) (per reprt pt has lost 30-40# since Cnacer dx)  % Weight Change (Calculated): -16.7                    BMI Categories: Underweight (BMI less than 22) age over 72    Estimated Daily Nutrient Needs:  Energy Requirements Based On: Kcal/kg  Weight Used for Energy Requirements: Current  Energy (kcal/day): 2000-2300 based ~ 30-33 kcal/kg cbw  Weight Used for Protein Requirements: Current  Protein (g/day): 82 based ~ 1.2 gr/kg cbw  Method Used for Fluid Requirements: 1 ml/kcal  Fluid (ml/day): 2000-2300    Nutrition Diagnosis:   Unintended weight loss related to catabolic illness as evidenced by weight loss    Nutrition Interventions:   Food and/or Nutrient Delivery: Continue Current Diet, Continue Oral Nutrition Supplement  Nutrition Education/Counseling: No recommendation at this time  Coordination of Nutrition Care: Continue to monitor while inpatient       Goals:  Previous Goal Met: Progressing toward Goal(s)  Goals: PO intake 50% or greater, by next RD assessment       Nutrition Monitoring and Evaluation:   Behavioral-Environmental Outcomes: None Identified  Food/Nutrient Intake Outcomes: Food and Nutrient Intake, Supplement Intake       Discharge Planning:     Too soon to determine     Di Postin, [address], [place]  Contact: [phone]

## 2023-01-06 NOTE — PROGRESS NOTES
RT Inhaler-Nebulizer Bronchodilator Protocol Note    There is a bronchodilator order in the chart from a provider indicating to follow the RT Bronchodilator Protocol and there is an Initiate RT Inhaler-Nebulizer Bronchodilator Protocol order as well (see protocol at bottom of note). CXR Findings:  No results found. The findings from the last RT Protocol Assessment were as follows:   History Pulmonary Disease: (P) Chronic pulmonary disease  Respiratory Pattern: (P) Mild dyspnea at rest, irregular pattern, or RR 21-25 bpm  Breath Sounds: (P) Slightly diminished and/or crackles  Cough: (P) Weak, productive  Indication for Bronchodilator Therapy: (P) On home bronchodilators  Bronchodilator Assessment Score: (P) 10    Aerosolized bronchodilator medication orders have been revised according to the RT Inhaler-Nebulizer Bronchodilator Protocol below. Respiratory Therapist to perform RT Therapy Protocol Assessment initially then follow the protocol. Repeat RT Therapy Protocol Assessment PRN for score 0-3 or on second treatment, BID, and PRN for scores above 3. No Indications - adjust the frequency to every 6 hours PRN wheezing or bronchospasm, if no treatments needed after 48 hours then discontinue using Per Protocol order mode. If indication present, adjust the RT bronchodilator orders based on the Bronchodilator Assessment Score as indicated below. Use Inhaler orders unless patient has one or more of the following: on home nebulizer, not able to hold breath for 10 seconds, is not alert and oriented, cannot activate and use MDI correctly, or respiratory rate 25 breaths per minute or more, then use the equivalent nebulizer order(s) with same Frequency and PRN reasons based on the score. If a patient is on this medication at home then do not decrease Frequency below that used at home.     0-3 - enter or revise RT bronchodilator order(s) to equivalent RT Bronchodilator order with Frequency of every 4 hours PRN for wheezing or increased work of breathing using Per Protocol order mode. 4-6 - enter or revise RT Bronchodilator order(s) to two equivalent RT bronchodilator orders with one order with BID Frequency and one order with Frequency of every 4 hours PRN wheezing or increased work of breathing using Per Protocol order mode. 7-10 - enter or revise RT Bronchodilator order(s) to two equivalent RT bronchodilator orders with one order with TID Frequency and one order with Frequency of every 4 hours PRN wheezing or increased work of breathing using Per Protocol order mode. 11-13 - enter or revise RT Bronchodilator order(s) to one equivalent RT bronchodilator order with QID Frequency and an Albuterol order with Frequency of every 4 hours PRN wheezing or increased work of breathing using Per Protocol order mode. Greater than 13 - enter or revise RT Bronchodilator order(s) to one equivalent RT bronchodilator order with every 4 hours Frequency and an Albuterol order with Frequency of every 2 hours PRN wheezing or increased work of breathing using Per Protocol order mode.        Electronically signed by Gisselle Mcdonough RCP on 1/6/2023 at 8:06 AM

## 2023-01-06 NOTE — PROGRESS NOTES
AM assessment completed, see flow sheet. Pt is alert and oriented. Vital signs are WNL. Respirations are even & easy on 6L/NC/O2 resting and occasional MPC. Lung sounds diminished through out lung fields. Pt complaints voiced of anxiety that makes jaw tremor and hands shake which he states isn't new and not sure if takes anything at home for anxiety. Pt updated to ask \"Anh\" at home. Attempted to call Anh and left  for call back. Pt aware nothing ordered at this time. AM medications administered. Will administer prn ativan for comfort for now. Pt denies needs at this time. SR up x 2, and bed in low position. Call light is within reach.

## 2023-01-06 NOTE — PLAN OF CARE
Problem: Safety - Adult  Goal: Free from fall injury  1/5/2023 1916 by Radha Shannon RN  Outcome: Progressing  1/5/2023 1100 by Rae Finley RN  Outcome: Progressing     Problem: Nutrition Deficit:  Goal: Optimize nutritional status  1/5/2023 1916 by Radha Shannon RN  Outcome: Progressing  1/5/2023 1100 by Rae Finley RN  Outcome: Progressing

## 2023-01-06 NOTE — PLAN OF CARE
Problem: Discharge Planning  Goal: Discharge to home or other facility with appropriate resources  1/6/2023 0934 by Mary Jameson RN  Outcome: Progressing  1/6/2023 0931 by Mary Jameson RN  Outcome: Progressing     Problem: Safety - Adult  Goal: Free from fall injury  1/6/2023 0934 by Mary Jameson RN  Outcome: Progressing  1/6/2023 0931 by Mary Jameson RN  Outcome: Progressing     Problem: Nutrition Deficit:  Goal: Optimize nutritional status  1/6/2023 0934 by Mary Jameson RN  Outcome: Progressing  1/6/2023 0931 by Mary Jameson RN  Outcome: Progressing

## 2023-01-06 NOTE — PROGRESS NOTES
Bedside report given to Herber Will RN  pt in stable condition no needs at this time.  Call light within reach

## 2023-01-06 NOTE — PROGRESS NOTES
[facility]                                 Pulmonary Consult /Progress Note :                                                                  CHIEF COMPLAINT: SOB       HPI:     Still with SOB and need more O2 ,  7 L this am and weaning to 5   Has some tremors lips   No fever Or chills  No chest Pain   Some cough  He denies any stress    Objective:   PHYSICAL EXAM:    Blood pressure (!) 108/58, pulse 95, temperature 97.9 °F (36.6 °C), temperature source Oral, resp. rate 18, height 5' 11\" (1.803 m), weight 150 lb (68 kg), SpO2 91 %.' on RA  Gen: No distress. Eyes: PERRL. No sclera icterus. No conjunctival injection. ENT: No discharge. Pharynx clear. Neck: Trachea midline. No obvious mass. Resp:scattered rhonchibilateral   CV: Regular rate. Regular rhythm. No murmur or rub. No edema. GI: Non-tender. Non-distended. No hernia. Skin: Warm and dry. No nodule on exposed extremities. Lymph: No cervical LAD. No supraclavicular LAD. M/S: No cyanosis. No joint deformity. No clubbing. Neuro: Awake. Alert. Moves all four extremities. Psych: Oriented x 3. No anxiety.              LABS/IMAGING:    CBC:  Lab Results   Component Value Date    WBC 3.9 (L) 01/06/2023    HGB 12.3 (L) 01/06/2023    HCT 36.7 (L) 01/06/2023    MCV 96.4 01/06/2023     01/06/2023    LYMPHOPCT 34.2 01/06/2023    RBC 3.81 (L) 01/06/2023    MCH 32.4 01/06/2023    MCHC 33.6 01/06/2023    RDW 18.4 (H) 01/06/2023    NEUTOPHILPCT 53.0 01/06/2023    MONOPCT 12.7 01/06/2023    BASOPCT 0.1 01/06/2023    NEUTROABS 2.1 01/06/2023    LYMPHSABS 1.3 01/06/2023    MONOSABS 0.5 01/06/2023    EOSABS 0.0 01/06/2023    BASOSABS 0.0 01/06/2023       Recent Labs     01/06/23  0536 01/05/23  0544 01/04/23  0518   WBC 3.9* 3.4* 2.8*   HGB 12.3* 11.5* 11.4*   HCT 36.7* 34.1* 33.6*   MCV 96.4 96.7 95.8    204 180         BMP:   Recent Labs     01/04/23  0518 01/05/23  0544 01/06/23  0536  139 140   K 4.2 4.3 4.3    104 104   CO2 24 25 26   BUN 8 11 12   CREATININE <0.5* <0.5* <0.5*         MG:   Lab Results   Component Value Date/Time    MG 1.70 01/03/2023 12:55 PM     Ca/Phos:   Lab Results   Component Value Date    CALCIUM 8.6 01/06/2023     LIVER PROFILE:   Recent Labs     01/04/23 0518 01/05/23 0544 01/06/23 0536   AST 28 22 30   ALT 26 21 27   BILITOT 0.5 0.5 0.5   ALKPHOS 100 101 105         PT/INR: No results for input(s): PROTIME, INR in the last 72 hours. APTT: No results for input(s): APTT in the last 72 hours. MV Settings:     / / /     IV:   sodium chloride             Medications:  Scheduled Meds:   dronabinol  5 mg Oral BID    dexamethasone  6 mg IntraVENous Q12H    sodium chloride flush  5-40 mL IntraVENous 2 times per day    enoxaparin  30 mg SubCUTAneous BID    pantoprazole  40 mg Oral Daily    gabapentin  300 mg Oral TID    albuterol-ipratropium  1 puff Inhalation 4x daily       PRN Meds:  LORazepam, sodium chloride flush, sodium chloride, ondansetron **OR** ondansetron, polyethylene glycol, acetaminophen **OR** acetaminophen, potassium chloride **OR** potassium alternative oral replacement **OR** potassium chloride, magnesium sulfate, albuterol-ipratropium    Results:  CBC:   Recent Labs     01/04/23 0518 01/05/23 0544 01/06/23 0536   WBC 2.8* 3.4* 3.9*   HGB 11.4* 11.5* 12.3*   HCT 33.6* 34.1* 36.7*   MCV 95.8 96.7 96.4    204 201       BMP:   Recent Labs     01/04/23 0518 01/05/23 0544 01/06/23 0536    139 140   K 4.2 4.3 4.3    104 104   CO2 24 25 26   BUN 8 11 12   CREATININE <0.5* <0.5* <0.5*       LIVER PROFILE:   Recent Labs     01/04/23 0518 01/05/23 0544 01/06/23 0536   AST 28 22 30   ALT 26 21 27   BILITOT 0.5 0.5 0.5   ALKPHOS 100 101 105       PT/INR: No results for input(s): PROTIME, INR in the last 72 hours. APTT: No results for input(s): APTT in the last 72 hours.   UA:No results for input(s): NITRITE, COLORU, PHUR, LABCAST, WBCUA, RBCUA, MUCUS, TRICHOMONAS, YEAST, BACTERIA, CLARITYU, SPECGRAV, LEUKOCYTESUR, UROBILINOGEN, BILIRUBINUR, BLOODU, GLUCOSEU, AMORPHOUS in the last 72 hours. Invalid input(s): Charles Tsai    Cultures:      Films:  CXR reviewed by me and it showed 3cavitary lesion right upper lobe    Assessment:       -COVID-pneumonia  -Acute respiratory failure hypoxic/hyper Secondary  -Acute COPD  -History of lung cancer non-small cell status post chemo right  -Metastatic non-small cell lung cancer      Plan:     Will get CXR   Tremor in lips seems chronic,got worse with steroids ,will change dose 4 mg IV bid   IF infiltrate or GGO is more then will give Toci if only ok with oncology ,giving his treatment and cancer history,otherwise keep decadron 6 mg iv bid     *-Patient is 77-year-old with history of COPD lung cancer status post chemoradiation just finished recently came in with COVID-pneumonia along with leukopenia    CAT scan with minimal changes regarding COVID however he still that cavitary lesions  *-We will wean his oxygen   Decadron 6 mg bid  *-CXR  pending   *- Procalcitonin   Incentive spirometry   *-Giving the patient stability, leukopenia and recent chemoradiation I will hold on Tosi and observe   *_Avoid fluid overload  *-Watch for secondary infection,check proca  *-DVT  Discuss with IV team     We will follow along with you    In terms of his lung cancer he is status post chemoradiation so there is no further work-up from the pulmonary*      Thank you very much for allowing me to participate in the care of this pleasant patient , should you have any questions ,please do not hesitate to contact me      Michael Fleix MD,[facility]  Pulmonary&Critical [facility] Medicine    Misty Kumar

## 2023-01-06 NOTE — CONSULTS
[facility]                                 Pulmonary Consult /Progress Note :                                                                  CHIEF COMPLAINT: SOB       HPI:     Still with SOB and need more O2 ,  7 L this am and weaning to 5   Has some tremors lips   No fever Or chills  No chest Pain   Some cough  He denies any stress    Objective:   PHYSICAL EXAM:    Blood pressure (!) 108/58, pulse 95, temperature 97.9 °F (36.6 °C), temperature source Oral, resp. rate 18, height 5' 11\" (1.803 m), weight 150 lb (68 kg), SpO2 90 %.' on RA  Gen: No distress. Eyes: PERRL. No sclera icterus. No conjunctival injection. ENT: No discharge. Pharynx clear. Neck: Trachea midline. No obvious mass. Resp:scattered rhonchibilateral   CV: Regular rate. Regular rhythm. No murmur or rub. No edema. GI: Non-tender. Non-distended. No hernia. Skin: Warm and dry. No nodule on exposed extremities. Lymph: No cervical LAD. No supraclavicular LAD. M/S: No cyanosis. No joint deformity. No clubbing. Neuro: Awake. Alert. Moves all four extremities. Psych: Oriented x 3. No anxiety.              LABS/IMAGING:    CBC:  Lab Results   Component Value Date    WBC 3.9 (L) 01/06/2023    HGB 12.3 (L) 01/06/2023    HCT 36.7 (L) 01/06/2023    MCV 96.4 01/06/2023     01/06/2023    LYMPHOPCT 34.2 01/06/2023    RBC 3.81 (L) 01/06/2023    MCH 32.4 01/06/2023    MCHC 33.6 01/06/2023    RDW 18.4 (H) 01/06/2023    NEUTOPHILPCT 53.0 01/06/2023    MONOPCT 12.7 01/06/2023    BASOPCT 0.1 01/06/2023    NEUTROABS 2.1 01/06/2023    LYMPHSABS 1.3 01/06/2023    MONOSABS 0.5 01/06/2023    EOSABS 0.0 01/06/2023    BASOSABS 0.0 01/06/2023       Recent Labs     01/06/23  0536 01/05/23  0544 01/04/23  0518   WBC 3.9* 3.4* 2.8*   HGB 12.3* 11.5* 11.4*   HCT 36.7* 34.1* 33.6*   MCV 96.4 96.7 95.8    204 180         BMP:   Recent Labs     01/04/23  0518 01/05/23  0544 01/06/23  0536  139 140   K 4.2 4.3 4.3    104 104   CO2 24 25 26   BUN 8 11 12   CREATININE <0.5* <0.5* <0.5*         MG:   Lab Results   Component Value Date/Time    MG 1.70 01/03/2023 12:55 PM     Ca/Phos:   Lab Results   Component Value Date    CALCIUM 8.6 01/06/2023     LIVER PROFILE:   Recent Labs     01/04/23 0518 01/05/23 0544 01/06/23 0536   AST 28 22 30   ALT 26 21 27   BILITOT 0.5 0.5 0.5   ALKPHOS 100 101 105         PT/INR: No results for input(s): PROTIME, INR in the last 72 hours. APTT: No results for input(s): APTT in the last 72 hours. MV Settings:     / / /     IV:   sodium chloride             Medications:  Scheduled Meds:   dronabinol  5 mg Oral BID    dexamethasone  6 mg IntraVENous Q12H    sodium chloride flush  5-40 mL IntraVENous 2 times per day    enoxaparin  30 mg SubCUTAneous BID    pantoprazole  40 mg Oral Daily    gabapentin  300 mg Oral TID    albuterol-ipratropium  1 puff Inhalation 4x daily       PRN Meds:  LORazepam, sodium chloride flush, sodium chloride, ondansetron **OR** ondansetron, polyethylene glycol, acetaminophen **OR** acetaminophen, potassium chloride **OR** potassium alternative oral replacement **OR** potassium chloride, magnesium sulfate, albuterol-ipratropium    Results:  CBC:   Recent Labs     01/04/23 0518 01/05/23 0544 01/06/23 0536   WBC 2.8* 3.4* 3.9*   HGB 11.4* 11.5* 12.3*   HCT 33.6* 34.1* 36.7*   MCV 95.8 96.7 96.4    204 201       BMP:   Recent Labs     01/04/23 0518 01/05/23 0544 01/06/23 0536    139 140   K 4.2 4.3 4.3    104 104   CO2 24 25 26   BUN 8 11 12   CREATININE <0.5* <0.5* <0.5*       LIVER PROFILE:   Recent Labs     01/04/23 0518 01/05/23 0544 01/06/23 0536   AST 28 22 30   ALT 26 21 27   BILITOT 0.5 0.5 0.5   ALKPHOS 100 101 105       PT/INR: No results for input(s): PROTIME, INR in the last 72 hours. APTT: No results for input(s): APTT in the last 72 hours.   UA:No results for input(s): NITRITE, COLORU, PHUR, LABCAST, WBCUA, RBCUA, MUCUS, TRICHOMONAS, YEAST, BACTERIA, CLARITYU, SPECGRAV, LEUKOCYTESUR, UROBILINOGEN, BILIRUBINUR, BLOODU, GLUCOSEU, AMORPHOUS in the last 72 hours. Invalid input(s): Ruby Ceja    Cultures:      Films:  CXR reviewed by me and it showed 3cavitary lesion right upper lobe    Assessment:       -COVID-pneumonia  -Acute respiratory failure hypoxic/hyper Secondary  -Acute COPD  -History of lung cancer non-small cell status post chemo right  -Metastatic non-small cell lung cancer      Plan:     Will get CXR   Tremor in lips seems chronic,got worse with steroids ,will change dose 4 mg IV bid   IF infiltrate or GGO is more then will give Toci if only ok with oncology ,giving his treatment and cancer history,otherwise keep decadron 6 mg iv bid     *-Patient is 20-year-old with history of COPD lung cancer status post chemoradiation just finished recently came in with COVID-pneumonia along with leukopenia    CAT scan with minimal changes regarding COVID however he still that cavitary lesions  *-We will wean his oxygen   Decadron 6 mg bid  *-CXR  pending   *- Procalcitonin   Incentive spirometry   *-Giving the patient stability, leukopenia and recent chemoradiation I will hold on Tosi and observe   *_Avoid fluid overload  *-Watch for secondary infection,check proca  *-DVT    We will follow along with you    In terms of his lung cancer he is status post chemoradiation so there is no further work-up from the pulmonary*      Thank you very much for allowing me to participate in the care of this pleasant patient , should you have any questions ,please do not hesitate to contact me      Raymond Felix MD,[facility]  Pulmonary&Critical Care Medicine   Professor Franklyn Guillermo

## 2023-01-06 NOTE — PROGRESS NOTES
IM Progress Note    Admit Date:  1/3/2023    Patient with history of COPD and lung cancer presenting with increased shortness of breath, admitted for exacerbation of COPD, COVID-positive. Patient normally on oxygen 1 to 2 L, required O2 up to 3 L on presentation    Worsening hypoxemia now    Subjective:  Mr. Antonina Persaud has worsening hypoxemia today with O2 requirements up to 4 L-> 7L overnight. .  Patient desaturated to 84% on 4 L and needed O2 up to 7 L overnight . Currently on 6 L of oxygen. Patient overall feels well. Shortness of breath is improved. Cough is improved. Carly Leaf He is in no distress. No fevers or chills. Objective:   BP (!) 108/58   Pulse 95   Temp 97.9 °F (36.6 °C) (Oral)   Resp 18   Ht 5' 11\" (1.803 m)   Wt 150 lb (68 kg)   SpO2 90%   BMI 20.92 kg/m²     Intake/Output Summary (Last 24 hours) at 1/6/2023 1047  Last data filed at 1/5/2023 2044  Gross per 24 hour   Intake 10 ml   Output --   Net 10 ml           Physical Exam:  General:  Awake, alert, NAD  Skin:  Warm and dry  Neck:  JVD absent. Neck supple  Chest: Diminished breath sounds. No wheezes , no rales or rhonchi. Cardiovascular:  RRR ,S1S2 normal  Abdomen:  Soft, non tender, non distended, BS +  Extremities:  No edema. Intact peripheral pulses.  Brisk cap refill, < 2 secs  Neuro: non focal      Medications:   Scheduled Meds:   dronabinol  5 mg Oral BID    dexamethasone  6 mg IntraVENous Q12H    sodium chloride flush  5-40 mL IntraVENous 2 times per day    enoxaparin  30 mg SubCUTAneous BID    pantoprazole  40 mg Oral Daily    gabapentin  300 mg Oral TID    albuterol-ipratropium  1 puff Inhalation 4x daily       Continuous Infusions:   sodium chloride         Data:  CBC:   Recent Labs     01/04/23  0518 01/05/23  0544 01/06/23  0536   WBC 2.8* 3.4* 3.9*   RBC 3.51* 3.53* 3.81*   HGB 11.4* 11.5* 12.3*   HCT 33.6* 34.1* 36.7*   MCV 95.8 96.7 96.4   RDW 18.2* 18.2* 18.4*    204 201       BMP:   Recent Labs 01/04/23 0518 01/05/23 0544 01/06/23 0536    139 140   K 4.2 4.3 4.3    104 104   CO2 24 25 26   BUN 8 11 12   CREATININE <0.5* <0.5* <0.5*       BNP: No results for input(s): BNP in the last 72 hours. PT/INR: No results for input(s): PROTIME, INR in the last 72 hours. APTT: No results for input(s): APTT in the last 72 hours. CARDIAC ENZYMES:   Recent Labs     01/03/23  1255   TROPONINI <0.01       FASTING LIPID PANEL:No results found for: CHOL, HDL, TRIG  LIVER PROFILE:   Recent Labs     01/04/23 0518 01/05/23 0544 01/06/23 0536   AST 28 22 30   ALT 26 21 27   BILITOT 0.5 0.5 0.5   ALKPHOS 100 101 105          Cultures  Covid positive  Influenza not detected        Radiology  CT CHEST PULMONARY EMBOLISM W CONTRAST   Final Result   1. Negative for pulmonary embolus. 2. Cavitary 6 cm right upper lobe bronchogenic carcinoma with associated   rightward T2, T3 and T4 vertebral body/costal invasion. 3. Moderate to severe pathologic T3 compression fracture. Assessment:  Principal Problem:    Acute on chronic respiratory failure (HCC)  Active Problems:    COPD (chronic obstructive pulmonary disease) (HCC)    Lung cancer (HCC)    COVID-19    Hypomagnesemia    Hypokalemia    Acute on chronic respiratory failure with hypoxemia (HCC)  Resolved Problems:    * No resolved hospital problems. *      Plan:    #Acute hypoxic respiratory failure  #COPD AE  #COVID  -presented with increasing SOB and productive cough x1 week  -baseline O2 requirement 1L; patient was requiring oxygen 3 L on admission. He has worsening hypoxemia with increasing O2 requirement now. O2 requirement up to 4 L yesterday->  up to 7 L overnight and currently on 6 L. Continue supplemental oxygen and wean as tolerated. -CTPA negative   -Continue Decadron , dose increased to 60 mg IV twice daily now . .  -Continue inhaled bronchodilators.   -Pulmonology following   -droplet plus precautions     #Lung cancer  -s/p chemo and radiation  -follows w/ [facility]     #Hypomagnesemia  #Hypokalemia  -repleted   -monitor      #HTN  -BP low on admission  -hold lisinopril and amlodipine for now    BP stable      #GERD  -continue PPI     # Anxiety   -Check home medication-could not clarify home medication. Will use Ativan as needed for anxiety     DVT Prophylaxis: Lovenox  ADULT DIET;  Regular  ADULT ORAL NUTRITION SUPPLEMENT; Breakfast, Lunch, Dinner; Standard High Calorie/High Protein Oral Supplement   Full Code            Wallace Santizo MD   1/6/2023 10:47 AM

## 2023-01-06 NOTE — PROGRESS NOTES
Care  transferred from [facility] Pt appears to be sleeping at this time as manifested by eyes being closed. Respirations easy. No distress noted. Will continue to monitor.

## 2023-01-06 NOTE — FLOWSHEET NOTE
01/05/23 2037   Vital Signs   Temp 97.9 °F (36.6 °C)   Temp Source Oral   Heart Rate 73   Heart Rate Source Monitor   Resp 16   /71   MAP (Calculated) 83   BP Location Right upper arm   BP Method Automatic   Patient Position Semi fowlers   Level of Consciousness 0   MEWS Score 1   Oxygen Therapy   SpO2 95 %   O2 Device Nasal cannula   O2 Flow Rate (L/min) 4 L/min   Assessment complete, see flowsheets. Pt resting in bed, aware to call for any needs or changes and call light within reach. Will continue to monitor.   Laura Ruiz, RN

## 2023-01-07 LAB
BLOOD CULTURE, ROUTINE: NORMAL
CULTURE, BLOOD 2: NORMAL

## 2023-01-07 PROCEDURE — 2700000000 HC OXYGEN THERAPY PER DAY

## 2023-01-07 PROCEDURE — 6370000000 HC RX 637 (ALT 250 FOR IP)

## 2023-01-07 PROCEDURE — 99232 SBSQ HOSP IP/OBS MODERATE 35: CPT | Performed by: INTERNAL MEDICINE

## 2023-01-07 PROCEDURE — 97530 THERAPEUTIC ACTIVITIES: CPT

## 2023-01-07 PROCEDURE — 97166 OT EVAL MOD COMPLEX 45 MIN: CPT

## 2023-01-07 PROCEDURE — 94640 AIRWAY INHALATION TREATMENT: CPT

## 2023-01-07 PROCEDURE — 1200000000 HC SEMI PRIVATE

## 2023-01-07 PROCEDURE — 6370000000 HC RX 637 (ALT 250 FOR IP): Performed by: INTERNAL MEDICINE

## 2023-01-07 PROCEDURE — 97116 GAIT TRAINING THERAPY: CPT

## 2023-01-07 PROCEDURE — 2580000003 HC RX 258

## 2023-01-07 PROCEDURE — 97161 PT EVAL LOW COMPLEX 20 MIN: CPT

## 2023-01-07 PROCEDURE — 94761 N-INVAS EAR/PLS OXIMETRY MLT: CPT

## 2023-01-07 PROCEDURE — 6360000002 HC RX W HCPCS

## 2023-01-07 PROCEDURE — 6360000002 HC RX W HCPCS: Performed by: INTERNAL MEDICINE

## 2023-01-07 RX ADMIN — DRONABINOL 5 MG: 2.5 CAPSULE ORAL at 09:48

## 2023-01-07 RX ADMIN — GABAPENTIN 300 MG: 300 CAPSULE ORAL at 21:24

## 2023-01-07 RX ADMIN — PANTOPRAZOLE SODIUM 40 MG: 40 TABLET, DELAYED RELEASE ORAL at 09:48

## 2023-01-07 RX ADMIN — GABAPENTIN 300 MG: 300 CAPSULE ORAL at 09:48

## 2023-01-07 RX ADMIN — ACETAMINOPHEN 650 MG: 325 TABLET ORAL at 21:24

## 2023-01-07 RX ADMIN — Medication 10 ML: at 03:46

## 2023-01-07 RX ADMIN — DRONABINOL 5 MG: 2.5 CAPSULE ORAL at 21:24

## 2023-01-07 RX ADMIN — ACETAMINOPHEN 650 MG: 325 TABLET ORAL at 03:46

## 2023-01-07 RX ADMIN — DEXAMETHASONE SODIUM PHOSPHATE 4 MG: 4 INJECTION, SOLUTION INTRAMUSCULAR; INTRAVENOUS at 03:46

## 2023-01-07 RX ADMIN — ONDANSETRON 4 MG: 4 TABLET, ORALLY DISINTEGRATING ORAL at 21:25

## 2023-01-07 RX ADMIN — DEXAMETHASONE SODIUM PHOSPHATE 4 MG: 4 INJECTION, SOLUTION INTRAMUSCULAR; INTRAVENOUS at 15:10

## 2023-01-07 RX ADMIN — GABAPENTIN 300 MG: 300 CAPSULE ORAL at 15:10

## 2023-01-07 RX ADMIN — LORAZEPAM 0.5 MG: 0.5 TABLET ORAL at 03:46

## 2023-01-07 RX ADMIN — LORAZEPAM 0.5 MG: 0.5 TABLET ORAL at 21:25

## 2023-01-07 RX ADMIN — Medication 10 ML: at 09:48

## 2023-01-07 RX ADMIN — ENOXAPARIN SODIUM 30 MG: 100 INJECTION SUBCUTANEOUS at 21:25

## 2023-01-07 RX ADMIN — ENOXAPARIN SODIUM 30 MG: 100 INJECTION SUBCUTANEOUS at 09:48

## 2023-01-07 ASSESSMENT — PAIN DESCRIPTION - PAIN TYPE
TYPE: ACUTE PAIN
TYPE: ACUTE PAIN

## 2023-01-07 ASSESSMENT — PAIN DESCRIPTION - LOCATION
LOCATION: GENERALIZED
LOCATION: GENERALIZED

## 2023-01-07 ASSESSMENT — PAIN DESCRIPTION - FREQUENCY
FREQUENCY: INTERMITTENT
FREQUENCY: INTERMITTENT

## 2023-01-07 ASSESSMENT — PAIN DESCRIPTION - DESCRIPTORS
DESCRIPTORS: ACHING
DESCRIPTORS: ACHING

## 2023-01-07 ASSESSMENT — PAIN SCALES - GENERAL
PAINLEVEL_OUTOF10: 4
PAINLEVEL_OUTOF10: 3
PAINLEVEL_OUTOF10: 0

## 2023-01-07 ASSESSMENT — PAIN DESCRIPTION - ONSET
ONSET: ON-GOING
ONSET: ON-GOING

## 2023-01-07 ASSESSMENT — PAIN - FUNCTIONAL ASSESSMENT
PAIN_FUNCTIONAL_ASSESSMENT: ACTIVITIES ARE NOT PREVENTED
PAIN_FUNCTIONAL_ASSESSMENT: ACTIVITIES ARE NOT PREVENTED

## 2023-01-07 NOTE — FLOWSHEET NOTE
01/06/23 2037   Vital Signs   Temp 97.4 °F (36.3 °C)   Temp Source Oral   Heart Rate 74   Heart Rate Source Monitor   Resp 18   BP 95/61   MAP (Calculated) 72   BP Location Right upper arm   Patient Position Semi fowlers   Level of Consciousness 0   MEWS Score 2   Oxygen Therapy   SpO2 92 %   O2 Device High flow nasal cannula   O2 Flow Rate (L/min) 5 L/min   Assessment complete- see flowsheets. Pt resting in bed, aware to call for any needs or changes, denies further needs at this time. Call light within reach. Will continue to monitor.   Lillie Huber RN

## 2023-01-07 NOTE — PROGRESS NOTES
Report given in unit r/t COVID precautions to Nella Nur RN, for transferral of care. Pt resting in bed. Call light in reach.

## 2023-01-07 NOTE — PROGRESS NOTES
This RN was notified by Charge JULES Mckinnon that heart monitor was displaying Ventriclar tachycardia. This RN went to patient bedside to assess patient. PT and OT were working with patient at this time. Patient denied chest pain, dizziness or a headache. Patient had no complaints. Leads of telemetry unit were checked and all connections were secure.      Patient's heart rhythm is now normal sinus with 80 bpm.

## 2023-01-07 NOTE — PROGRESS NOTES
BP 92/62   Pulse 68   Temp 97.2 °F (36.2 °C) (Oral)   Resp 18   Ht 5' 11\" (1.803 m)   Wt 150 lb (68 kg)   SpO2 93%   BMI 20.92 kg/m²     Assessment complete. Meds passed. Pt denies needs at this time    Patient denies pain this morning. Patient ate all breakfast, sitting up watching TV    Bedside Mobility Assessment Tool (BMAT):     Assessment Level 1- Sit and Shake    1. From a semi-reclined position, ask patient to sit up and rotate to a seated position at the side of the bed. Can use the bedrail. 2. Ask patient to reach out and grab your hand and shake making sure patient reaches across his/her midline. Pass- Patient is able to come to a seated position, maintain core strength. Maintains seated balance while reaching across midline. Move on to Assessment Level 2. Assessment Level 2- Stretch and Point   1. With patient in seated position at the side of the bed, have patient place both feet on the floor (or stool) with knees no higher than hips. 2. Ask patient to stretch one leg and straighten the knee, then bend the ankle/flex and point the toes. If appropriate, repeat with the other leg. Pass- Patient is able to demonstrate appropriate quad strength on intended weight bearing limb(s). Move onto Assessment Level 3. Assessment Level 3- Stand   1. Ask patient to elevate off the bed or chair (seated to standing) using an assistive device (cane, bedrail). 2. Patient should be able to raise buttocks off be and hold for a count of five. May repeat once. Pass- Patient maintains standing stability for at least 5 seconds, proceed to assessment level 4. Assessment Level 4- Walk   1. Ask patient to march in place at bedside. 2. Then ask patient to advance step and return each foot. Some medical conditions may render a patient from stepping backwards, use your best clinical judgement.    Pass- Patient demonstrates balance while shifting weight and ability to step, takes independent steps, does not use assistive device patient is MOBILITY LEVEL 4.       Mobility Level- 4

## 2023-01-07 NOTE — PROGRESS NOTES
Inpatient Physical Therapy Evaluation and Treatment    Unit: 2 [facility]  Date:  1/7/2023  Patient Name:    Ritchie Gonzalez  Admitting diagnosis:  Hypokalemia [E87.6]  Hypomagnesemia [E83.42]  Acute on chronic respiratory failure ([facility] [address] [J96.20]  COVID-19 [U07.1]  Acute respiratory failure due to COVID-19 ([facility] [address] [U07.1, J96.00]  Admit Date:  1/3/2023  Precautions/Restrictions/WB Status/ Lines/ Wounds/ Oxygen: Fall risk, Bed/chair alarm, Lines -IV and Supplemental O2 (5L HF), Telemetry, Continuous pulse oximetry, and Isolation Precautions: Droplet Plus - COVID    Treatment Time:  7:55 - 8:35  Treatment Number:  1   Timed Code Treatment Minutes: 30 minutes  Total Treatment Minutes:  40  minutes    Patient Goals for Therapy: \" go home \"          Discharge Recommendations: Home PRN assist (would benefit from initial 24hr spv when returning home)  DME needs for discharge: Needs Met       Therapy recommendation for EMS Transport: can transport by wheelchair    Therapy recommendations for staff:   Stand by assist with use of No AD and gait belt for all transfers and ambulation to/from [facility]  to/from chair  to/from bathroom  within room    History of Present Illness:   Per MARILEE Islas:   The patient is a 74 y.o. male with COPD and lung cancer who was referred to [facility] ED ED by his oncologist regarding concern of increased SOB x2 weeks. States this is especially worse with exertion. He also has a productive cough, slightly worse than his baseline cough. He reports compliance with home COPD medications. Denies associated fever, chills, nausea, vomiting, constipation, abdominal pain, or chest pain. He does endorse diarrhea ongoing for the last week, approximately 3-5 loose bowel movements per day. Denies blood in stool. Home Health S4 Level Recommendation:  NA  AM-PAC Mobility Score    AM-PAC Inpatient Mobility Raw Score : 22       Preadmission Environment    Pt.  Lives Alone  Home environment:  one story home  Steps to enter first floor:  1 steps to enter and hand rail bilateral  Steps to second floor: N/A  Bathroom: tub/shower unit, standard height commode, and HH shower head  Equipment owned: RW, shower chair/bench, and home O2 (2-2.5L) continous    Preadmission Status:  Pt. Able to drive: Yes  Pt Fully independent with ADLs: Yes  Pt. Required assistance from family for: Independent PTA  Pt. independent for transfers and gait and walked with No Device  History of falls No  Pt reports he mostly stays in his home. Pt states his wife Bienvenido Velasquez could come and provide 24hr care at D/C if needed. Pain   No  Location:   Rating: NA /10  Pain Medicine Status: Denies need    Cognition    A&O x4   Able to follow 2 step commands    Subjective  Patient lying supine in bed with no family present. Pt agreeable to this PT eval & tx. Upper Extremity ROM/Strength  Please see OT evaluation. Lower Extremity ROM / Strength   AROM WFL: Yes  ROM limitations:     BLE strength WFL, but not formally assessed with MMT.      Lower Extremity Sensation    WFL    Lower Extremity Proprioception:   NT    Coordination and Tone  NT    Balance  Sitting:  Normal; Independent  Comments: EOB    Standing: Good ; SBA  Comments:     Bed Mobility   Supine to Sit:    Independent  Sit to Supine:   Independent  Rolling:   Independent  Scooting in sitting: Independent  Scooting in supine:  Not Tested    Transfer Training     Sit to stand:   Supervision  Stand to sit:   Supervision  Bed to Chair:   Not Tested with use of N/A    Gait gait completed as indicated below  Distance:      40 ft + 10 ft  Deviations (firm surface/linoleum):  decreased bon, step through pattern, and decreased step length bilaterally  Assistive Device Used:    No AD and gait belt  Level of Assist:    SBA and one occurrence of min A  Comment: one LOB     Stair Training deferred, pt unsafe/ not appropriate to complete stairs at this time    Activity Tolerance   Pt completed therapy session with SOB noted with gait    Seated in bed:  SpO2: 93% on 5L O2  HR: 84 bpm  BP: 96/63    Positioning Needs   Pt in bed, no alarm needed, positioned in proper neutral alignment and pressure relief provided. Call light provided and all needs within reach    Exercises Initiated  Sara deferred secondary to treatment focus on functional mobility  NA    Other  None. Patient/Family Education   Pt educated on role of inpatient PT, POC, importance of continued activity, DC recommendations, safety awareness, transfer techniques, pursed lip breathing, pacing activity, and calling for assist with mobility. Assessment  Pt seen for Physical Therapy evaluation in acute care setting. Pt demonstrated decreased Activity tolerance, Balance, Safety, and Strength as well as decreased independence with Ambulation, Bed Mobility , and Transfers. Recommending Home PRN assist upon discharge as patient functioning independently and close to baseline level    Goals : To be met in 3 visits:  1). Independent with LE Ex x 10 reps    To be met in 6 visits:  1). Sit to/from stand: Independent  2). Bed to chair: Independent  3). Gait: Ambulate 150 ft.  with  Independent and use of No AD  4). Tolerate B LE exercises 3 sets of 10-15 reps  5). Ascend/descend 1 steps with Supervision with use of no handrail and No AD    Rehabilitation Potential: Good  Strengths for achieving goals include:   Pt motivated, PLOF, Family Support, and Pt cooperative   Barriers to achieving goals include:    No Barriers    Plan    To be seen 2-3 x / week  while in acute care setting for therapeutic exercises, bed mobility, transfers, progressive gait training, balance training, and family/patient education. Signature: Apurva Jeffers, PT, DPT, OMT-C  #[id number]      If patient discharges from this facility prior to next visit, this note will serve as the Discharge Summary.

## 2023-01-07 NOTE — PROGRESS NOTES
RT Inhaler-Nebulizer Bronchodilator Protocol Note    There is a bronchodilator order in the chart from a provider indicating to follow the RT Bronchodilator Protocol and there is an Initiate RT Inhaler-Nebulizer Bronchodilator Protocol order as well (see protocol at bottom of note). CXR Findings:  XR CHEST PORTABLE    Result Date: 1/6/2023  1. No acute cardiopulmonary findings. 2.  Stable appearance of the known right upper lobe cavitary mass. The findings from the last RT Protocol Assessment were as follows:   History Pulmonary Disease: Chronic pulmonary disease  Respiratory Pattern: Dyspnea on exertion or RR 21-25 bpm  Breath Sounds: Slightly diminished and/or crackles  Cough: Strong, spontaneous, non-productive  Indication for Bronchodilator Therapy: Decreased or absent breath sounds  Bronchodilator Assessment Score: 6    Aerosolized bronchodilator medication orders have been revised according to the RT Inhaler-Nebulizer Bronchodilator Protocol below. Respiratory Therapist to perform RT Therapy Protocol Assessment initially then follow the protocol. Repeat RT Therapy Protocol Assessment PRN for score 0-3 or on second treatment, BID, and PRN for scores above 3. No Indications - adjust the frequency to every 6 hours PRN wheezing or bronchospasm, if no treatments needed after 48 hours then discontinue using Per Protocol order mode. If indication present, adjust the RT bronchodilator orders based on the Bronchodilator Assessment Score as indicated below. Use Inhaler orders unless patient has one or more of the following: on home nebulizer, not able to hold breath for 10 seconds, is not alert and oriented, cannot activate and use MDI correctly, or respiratory rate 25 breaths per minute or more, then use the equivalent nebulizer order(s) with same Frequency and PRN reasons based on the score.   If a patient is on this medication at home then do not decrease Frequency below that used at home.    0-3 - enter or revise RT bronchodilator order(s) to equivalent RT Bronchodilator order with Frequency of every 4 hours PRN for wheezing or increased work of breathing using Per Protocol order mode. 4-6 - enter or revise RT Bronchodilator order(s) to two equivalent RT bronchodilator orders with one order with BID Frequency and one order with Frequency of every 4 hours PRN wheezing or increased work of breathing using Per Protocol order mode. 7-10 - enter or revise RT Bronchodilator order(s) to two equivalent RT bronchodilator orders with one order with TID Frequency and one order with Frequency of every 4 hours PRN wheezing or increased work of breathing using Per Protocol order mode. 11-13 - enter or revise RT Bronchodilator order(s) to one equivalent RT bronchodilator order with QID Frequency and an Albuterol order with Frequency of every 4 hours PRN wheezing or increased work of breathing using Per Protocol order mode. Greater than 13 - enter or revise RT Bronchodilator order(s) to one equivalent RT bronchodilator order with every 4 hours Frequency and an Albuterol order with Frequency of every 2 hours PRN wheezing or increased work of breathing using Per Protocol order mode. RT to enter RT Home Evaluation for COPD & MDI Assessment order using Per Protocol order mode.     Electronically signed by Telma Cruz RCP on 1/7/2023 at 7:51 AM

## 2023-01-07 NOTE — PROGRESS NOTES
PULMONARY PROGRESS NOTE  CC: Increasing shortness of breath x1 week, admitted for COPD exacerbation, COVID positive    Subjective:   Hypoxemia worsened yesterday up to 7 L O2 despite patient feeling well overall. Currently down to 3.5 L O2. Worked with PT/OT today. He has no concerns. IV line: Peripheral    PHYSICAL EXAM:   BP 92/62   Pulse 68   Temp 97.2 °F (36.2 °C) (Oral)   Resp 18   Ht 5' 11\" (1.803 m)   Wt 150 lb (68 kg)   SpO2 93%   BMI 20.92 kg/m² ' on 6 L  Constitutional:  No acute distress   HEENT:  No scleral icterus  Neck:  No tracheal deviation present. Cardiovascular:  Normal heart sounds. Pulmonary/Chest:  No wheezes. No rhonchi. No rales. ++ decreased breath sounds. No accessory muscle usage or stridor. Abdominal:  Soft. Musculoskeletal:  No cyanosis. No clubbing. Skin:  Skin is warm and dry. Scheduled Meds:   dexamethasone  4 mg IntraVENous Q12H    dronabinol  5 mg Oral BID    sodium chloride flush  5-40 mL IntraVENous 2 times per day    enoxaparin  30 mg SubCUTAneous BID    pantoprazole  40 mg Oral Daily    gabapentin  300 mg Oral TID    albuterol-ipratropium  1 puff Inhalation 4x daily     Continuous Infusions:   sodium chloride       PRN Meds:  LORazepam, sodium chloride flush, sodium chloride, ondansetron **OR** ondansetron, polyethylene glycol, acetaminophen **OR** acetaminophen, potassium chloride **OR** potassium alternative oral replacement **OR** potassium chloride, magnesium sulfate, albuterol-ipratropium    Labs:  CBC:   Recent Labs     01/05/23  0544 01/06/23  0536   WBC 3.4* 3.9*   HGB 11.5* 12.3*   HCT 34.1* 36.7*   MCV 96.7 96.4    201     BMP:   Recent Labs     01/05/23  0544 01/06/23  0536    140   K 4.3 4.3    104   CO2 25 26   BUN 11 12   CREATININE <0.5* <0.5*     Micro:   1/3/23 SARS-CoV-2 DETECTED    Procalcitonin 0.08 > 0.08  CRP 10.8  proBNP 119    Imaging: Chest imaging was reviewed by me and showed:  CTPA 1/3/2023  1. Negative for pulmonary embolus. 2. Cavitary 6 cm right upper lobe bronchogenic carcinoma with associated   rightward T2, T3 and T4 vertebral body/costal invasion. 3. Moderate to severe pathologic T3 compression fracture. ASSESSMENT:  Acute on chronic hypoxemic & hypercapnic respiratory failure; baseline 2 - 2.5 L O2  COVID pneumonia  COPD with acute exacerbation  Metastatic non-small cell lung cancer s/p chemoradiation  [facility]    PLAN:  COVID-19 isolation, droplet plus  Supplemental oxygen to maintain SaO2 >92%; wean as tolerated    Inhaled bronchodilators   Steroid D#5, Decadron 4 mg BID   Prophylaxis: Lovenox    I spent a total of 10 minutes on this encounter personally obtaining the patient history, reviewing labs, imaging and other data. I independently performed the above physical exam and updated this encounter note, assessment and plan as needed. Johnny Nielsen MD on 1/7/23 at 2:36 PM EST    I spent a total of 7 minutes on this encounter on chart review, history taking and review of data. I updated this encounter note as needed.    Alfredito Fuller PA-C on 1/7/23 at 10:29 AM EST

## 2023-01-07 NOTE — PROGRESS NOTES
IM Progress Note    Admit Date:  1/3/2023    Patient with history of COPD and lung cancer presenting with increased shortness of breath, admitted for exacerbation of COPD, COVID-positive. Patient normally on oxygen 1 to 2 L, required O2 up to 3 L on presentation  Worsening hypoxemia-O2 requirement progressively up to 7 L      1/7  Patient is starting to feel better today and hypoxemia improving    Subjective:  Mr. Brianna Tuttle looks and feels much better . Has any shortness of breath . Oxygen has been weaned down to 3.5 L . Decreased cough . He is in no distress. No fevers or chills. Objective:   BP 92/62   Pulse 68   Temp 97.2 °F (36.2 °C) (Oral)   Resp 18   Ht 5' 11\" (1.803 m)   Wt 150 lb (68 kg)   SpO2 92%   BMI 20.92 kg/m²     Intake/Output Summary (Last 24 hours) at 1/7/2023 1310  Last data filed at 1/6/2023 2038  Gross per 24 hour   Intake 10 ml   Output --   Net 10 ml           Physical Exam:  General:  Awake, alert, NAD  Skin:  Warm and dry  Neck:  JVD absent. Neck supple  Chest: Diminished breath sounds. No wheezes , no rales or rhonchi. Cardiovascular:  RRR ,S1S2 normal  Abdomen:  Soft, non tender, non distended, BS +  Extremities:  No edema. Intact peripheral pulses.  Brisk cap refill, < 2 secs  Neuro: non focal      Medications:   Scheduled Meds:   dexamethasone  4 mg IntraVENous Q12H    dronabinol  5 mg Oral BID    sodium chloride flush  5-40 mL IntraVENous 2 times per day    enoxaparin  30 mg SubCUTAneous BID    pantoprazole  40 mg Oral Daily    gabapentin  300 mg Oral TID    albuterol-ipratropium  1 puff Inhalation 4x daily       Continuous Infusions:   sodium chloride         Data:  CBC:   Recent Labs     01/05/23 0544 01/06/23 0536   WBC 3.4* 3.9*   RBC 3.53* 3.81*   HGB 11.5* 12.3*   HCT 34.1* 36.7*   MCV 96.7 96.4   RDW 18.2* 18.4*    201       BMP:   Recent Labs     01/05/23 0544 01/06/23 0536    140   K 4.3 4.3    104   CO2 25 26   BUN 11 12   CREATININE <0.5* <0.5*       BNP: No results for input(s): BNP in the last 72 hours. PT/INR: No results for input(s): PROTIME, INR in the last 72 hours. APTT: No results for input(s): APTT in the last 72 hours. CARDIAC ENZYMES:   No results for input(s): CKMB, CKMBINDEX, TROPONINI in the last 72 hours. Invalid input(s): CKTOTAL;3    FASTING LIPID PANEL:No results found for: CHOL, HDL, TRIG  LIVER PROFILE:   Recent Labs     01/05/23  0544 01/06/23  0536   AST 22 30   ALT 21 27   BILITOT 0.5 0.5   ALKPHOS 101 105          Cultures  Covid positive  Influenza not detected        Radiology  XR CHEST PORTABLE   Final Result   1. No acute cardiopulmonary findings. 2.  Stable appearance of the known right upper lobe cavitary mass. CT CHEST PULMONARY EMBOLISM W CONTRAST   Final Result   1. Negative for pulmonary embolus. 2. Cavitary 6 cm right upper lobe bronchogenic carcinoma with associated   rightward T2, T3 and T4 vertebral body/costal invasion. 3. Moderate to severe pathologic T3 compression fracture. Assessment:  Principal Problem:    Acute on chronic respiratory failure (HCC)  Active Problems:    COPD (chronic obstructive pulmonary disease) (HCC)    Lung cancer (HCC)    COVID-19    Hypomagnesemia    Hypokalemia    Acute on chronic respiratory failure with hypoxemia (HCC)  Resolved Problems:    * No resolved hospital problems. *      Plan:    #Acute hypoxic respiratory failure  #COPD AE  #COVID  -presented with increasing SOB and productive cough x1 week  -baseline O2 requirement 1L; patient was requiring oxygen 3 L on admission. He developed worsening hypoxemia with increasing O2 requirement, progressively up to 7 L. Continued supplemental oxygen and weaned as tolerated-> O2 weaned down to 3.5 L today  -CTPA negative   -Continue Decadron , dose increased to 6 mg IV twice daily on 1/5 ; currently dose adjusted to 4 mg IV twice daily.   Continue current dosing, hypoxemia improving  -Continue inhaled bronchodilators. -Pulmonology following   -droplet plus precautions     #Lung cancer  -s/p chemo and radiation  -follows w/ [facility]     #Hypomagnesemia  #Hypokalemia  -repleted   -monitor      #HTN  -BP low on admission  -hold lisinopril and amlodipine for now    BP stable      #GERD  -continue PPI     # Anxiety   -Check home medication-could not clarify home medication. Will use Ativan as needed for anxiety     DVT Prophylaxis: Lovenox  ADULT DIET; Regular  ADULT ORAL NUTRITION SUPPLEMENT; Breakfast, Lunch, Dinner; Standard High Calorie/High Protein Oral Supplement   Full Code      Patient slowly improving daily; wean oxygen as tolerated.    Likely plans for discharge on Monday, if O2 requirement is back to his baseline of 1 to 2 L      Roxanne Salas MD   1/7/2023 1:10 PM

## 2023-01-07 NOTE — PROGRESS NOTES
RT Inhaler-Nebulizer Bronchodilator Protocol Note    There is a bronchodilator order in the chart from a provider indicating to follow the RT Bronchodilator Protocol and there is an Initiate RT Inhaler-Nebulizer Bronchodilator Protocol order as well (see protocol at bottom of note). CXR Findings:  XR CHEST PORTABLE    Result Date: 1/6/2023  1. No acute cardiopulmonary findings. 2.  Stable appearance of the known right upper lobe cavitary mass. The findings from the last RT Protocol Assessment were as follows:   History Pulmonary Disease: Chronic pulmonary disease  Respiratory Pattern: Dyspnea on exertion or RR 21-25 bpm  Breath Sounds: Slightly diminished and/or crackles  Cough: Strong, spontaneous, non-productive  Indication for Bronchodilator Therapy: On home bronchodilators  Bronchodilator Assessment Score: 6    Aerosolized bronchodilator medication orders have been revised according to the RT Inhaler-Nebulizer Bronchodilator Protocol below. Respiratory Therapist to perform RT Therapy Protocol Assessment initially then follow the protocol. Repeat RT Therapy Protocol Assessment PRN for score 0-3 or on second treatment, BID, and PRN for scores above 3. No Indications - adjust the frequency to every 6 hours PRN wheezing or bronchospasm, if no treatments needed after 48 hours then discontinue using Per Protocol order mode. If indication present, adjust the RT bronchodilator orders based on the Bronchodilator Assessment Score as indicated below. Use Inhaler orders unless patient has one or more of the following: on home nebulizer, not able to hold breath for 10 seconds, is not alert and oriented, cannot activate and use MDI correctly, or respiratory rate 25 breaths per minute or more, then use the equivalent nebulizer order(s) with same Frequency and PRN reasons based on the score. If a patient is on this medication at home then do not decrease Frequency below that used at home.     0-3 - enter or revise RT bronchodilator order(s) to equivalent RT Bronchodilator order with Frequency of every 4 hours PRN for wheezing or increased work of breathing using Per Protocol order mode. 4-6 - enter or revise RT Bronchodilator order(s) to two equivalent RT bronchodilator orders with one order with BID Frequency and one order with Frequency of every 4 hours PRN wheezing or increased work of breathing using Per Protocol order mode. 7-10 - enter or revise RT Bronchodilator order(s) to two equivalent RT bronchodilator orders with one order with TID Frequency and one order with Frequency of every 4 hours PRN wheezing or increased work of breathing using Per Protocol order mode. 11-13 - enter or revise RT Bronchodilator order(s) to one equivalent RT bronchodilator order with QID Frequency and an Albuterol order with Frequency of every 4 hours PRN wheezing or increased work of breathing using Per Protocol order mode. Greater than 13 - enter or revise RT Bronchodilator order(s) to one equivalent RT bronchodilator order with every 4 hours Frequency and an Albuterol order with Frequency of every 2 hours PRN wheezing or increased work of breathing using Per Protocol order mode.        Electronically signed by Gabriel Patel RCP on 1/6/2023 at 7:59 PM

## 2023-01-07 NOTE — PLAN OF CARE
Problem: Safety - Adult  Goal: Free from fall injury  1/6/2023 1933 by Renetta Claudio RN  Outcome: Progressing  1/6/2023 0934 by Davion Santos RN  Outcome: Progressing  1/6/2023 0931 by Davion Santos RN  Outcome: Progressing     Problem: Nutrition Deficit:  Goal: Optimize nutritional status  1/6/2023 1933 by Renetta Claudio RN  Outcome: Progressing  1/6/2023 0934 by Davion Santos RN  Outcome: Progressing  1/6/2023 0931 by Davion Santos RN  Outcome: Progressing

## 2023-01-07 NOTE — PROGRESS NOTES
Occupational Therapy  Inpatient Occupational Therapy  Evaluation and Treatment    Unit: [facility]  Date:  1/7/2023  Patient Name:    Florencia Polk  Admitting diagnosis:  Hypokalemia [E87.6]  Hypomagnesemia [E83.42]  Acute on chronic respiratory failure ([facility] [address] [J96.20]  COVID-19 [U07.1]  Acute respiratory failure due to COVID-19 ([facility] [address] [U07.1, J96.00]  Admit Date:  1/3/2023  Precautions/Restrictions/WB Status/ Lines/ Wounds/ Oxygen: fall risk, supplemental O2 (5L), telemetry, and Droplet Plus precautions (+ COVID 19)    Treatment Time:  8252-4632  Treatment Number: 1     Billable Treatment Time: 30 minutes   Total Treatment Time:   40   minutes    Patient Goals for Therapy:  None stated      Discharge Recommendations: Home with 24/7 assist  DME needs for discharge: Needs Met       Therapy recommendations for staff:   Assist of 1 with use of gait belt for all transfers and ambulation to/from bathroom  within room  to/from chair    History of Present Illness: Per H&P  \"Per MARILEE Tejeda:   The patient is a 74 y.o. male with COPD and lung cancer who was referred to [facility] ED ED by his oncologist regarding concern of increased SOB x2 weeks. States this is especially worse with exertion. He also has a productive cough, slightly worse than his baseline cough. He reports compliance with home COPD medications. Denies associated fever, chills, nausea, vomiting, constipation, abdominal pain, or chest pain. He does endorse diarrhea ongoing for the last week, approximately 3-5 loose bowel movements per day. Denies blood in stool. \"    Home Health S4 Level Recommendation:  Level 1 Standard  AM-PAC Score: AM-PAC Inpatient Daily Activity Raw Score: 20     Preadmission Environment    Pt.  Lives Alone  Home environment:    one story home  Steps to enter first floor:  1 steps to enter and hand rail bilateral  Steps to second floor: N/A  Bathroom: tub/shower unit, standard height commode, and HH shower head  Equipment owned: [address] [place] [address], [facility], and home O2 (2-2.5L) continous     Preadmission Status:  Pt. Able to drive: Yes  Pt Fully independent with ADLs: Yes  Pt. Required assistance from family for: Independent PTA  Pt. independent for transfers and gait and walked with No Device  History of falls No  Pt reports he mostly stays in his home.  Pt states his wife Anh could come and provide 24hr care at D/C if needed.    Subjective  Patient found in Bed with no family present.  Pt agreeable to this OT eval & tx.     Pain   No  Location:   Rating: NA /10  Pain Medicine Status: Denies need     Cognition    A&O x4   Able to follow 2 step commands       Upper Extremity ROM:    WFL,  pt able to perform all bed mobility, transfers, and gait without ROM limitation.    Upper Extremity Strength:    Strength Assessment (measured on a 0-5 scale):    RUE:  Shoulder flexion +4  Elbow flexion  4  Elbow extension 4    LUE:  Shoulder flexion 4  Elbow flexion  4  Elbow extension 4    Upper Extremity Sensation    WFL    Upper Extremity Proprioception:  WNL    Coordination and Tone  WNL    Balance  Static Sitting:   Supervision EOB  Dynamic Sitting:  Not Tested  Static Standing:   SBA  Dynamic Standing:  Not Tested    Bed mobility:    Supine to sit:   Not Tested pt in long sit.  Sit to supine:   Not Tested pt returned in long sit  Rolling:    Not Tested  Scooting in sitting:  Supervision  Scooting to head of bed:   Not Tested    Bridging:   Not Tested    Transfers:    Sit to stand:  SBA  Stand to sit:  SBA  Bed to chair:   SBA with periods of CGA.   Standard toilet: Not Tested  Bed to BSC:  Not Tested    Pt participated in functional transfer from bed to chair and from chair to door back to the bed. Pt demonstrated 1 lateral LOB while trying to manage O2 tubing.   See PT note for gait analysis.    Dressing:      UE:   Not Tested  LE:    Not Tested    Bathing:    UE:  Not Tested  LE:  Not Tested    Eating:   Not Tested    Toileting:  Not  Tested    Grooming/hygiene: Not Tested    Activity Tolerance   Pt completed therapy session with SOB noted with gait     Seated in bed:  SpO2: 93% on 5L O2  HR: 84 bpm  BP: 96/63      Positioning Needs: In bed, call light and needs in reach. No alarm per RN. Exercise / Activities Initiated:   N/A    Patient/Family Education:   Role of OT  Recommendations for DC    Assessment of Deficits: Pt seen for Occupational therapy evaluation in acute care setting. Pt demonstrated decreased Activity tolerance, IADLs, Safety Awareness, and Transfers. Pt functioning below baseline and will likely benefit from skilled occupational therapy services to maximize safety and independence. Goal(s) : To be met in 3 Visits:  1). Bed to toilet/BSC: Supervision    To be met in 5 Visits:  1). Supine to/from Sit:  Independent  2). Upper Body Bathing:   Independent  3). Lower Body Bathing:   Supervision  4). Upper Body Dressing:  Independent  5). Lower Body Dressing:  Supervision  6). Pt to demonstrate UE exs x 15 reps with minimal cues    Rehabilitation Potential:  Good for goals listed above. Strengths for achieving goals include: PLOF and Family Support  Barriers to achieving goals include:  Complexity of condition     Plan: To be seen 2-3 x/wk  while in acute care setting for therapeutic exercises, bed mobility, transfers, dressing, bathing, family/patient education, ADL/IADL retraining, energy conservation training. Hasmukh Bejarano OTR/L #[id number]      If patient discharges from this facility prior to next visit, this note will serve as the Discharge Summary

## 2023-01-08 PROBLEM — J96.21 ACUTE AND CHRONIC RESPIRATORY FAILURE WITH HYPOXIA (HCC): Status: ACTIVE | Noted: 2023-01-03

## 2023-01-08 PROCEDURE — 99232 SBSQ HOSP IP/OBS MODERATE 35: CPT | Performed by: INTERNAL MEDICINE

## 2023-01-08 PROCEDURE — 2580000003 HC RX 258

## 2023-01-08 PROCEDURE — 2700000000 HC OXYGEN THERAPY PER DAY

## 2023-01-08 PROCEDURE — 6360000002 HC RX W HCPCS: Performed by: INTERNAL MEDICINE

## 2023-01-08 PROCEDURE — 6360000002 HC RX W HCPCS

## 2023-01-08 PROCEDURE — 6370000000 HC RX 637 (ALT 250 FOR IP)

## 2023-01-08 PROCEDURE — 1200000000 HC SEMI PRIVATE

## 2023-01-08 PROCEDURE — 94640 AIRWAY INHALATION TREATMENT: CPT

## 2023-01-08 PROCEDURE — 94761 N-INVAS EAR/PLS OXIMETRY MLT: CPT

## 2023-01-08 PROCEDURE — 6370000000 HC RX 637 (ALT 250 FOR IP): Performed by: INTERNAL MEDICINE

## 2023-01-08 RX ADMIN — LORAZEPAM 0.5 MG: 0.5 TABLET ORAL at 21:14

## 2023-01-08 RX ADMIN — ACETAMINOPHEN 650 MG: 325 TABLET ORAL at 21:14

## 2023-01-08 RX ADMIN — DRONABINOL 5 MG: 2.5 CAPSULE ORAL at 09:40

## 2023-01-08 RX ADMIN — Medication 10 ML: at 09:40

## 2023-01-08 RX ADMIN — LORAZEPAM 0.5 MG: 0.5 TABLET ORAL at 05:14

## 2023-01-08 RX ADMIN — ACETAMINOPHEN 650 MG: 325 TABLET ORAL at 05:14

## 2023-01-08 RX ADMIN — GABAPENTIN 300 MG: 300 CAPSULE ORAL at 21:14

## 2023-01-08 RX ADMIN — GABAPENTIN 300 MG: 300 CAPSULE ORAL at 14:05

## 2023-01-08 RX ADMIN — Medication 10 ML: at 21:14

## 2023-01-08 RX ADMIN — DEXAMETHASONE SODIUM PHOSPHATE 4 MG: 4 INJECTION, SOLUTION INTRAMUSCULAR; INTRAVENOUS at 14:05

## 2023-01-08 RX ADMIN — ENOXAPARIN SODIUM 30 MG: 100 INJECTION SUBCUTANEOUS at 21:14

## 2023-01-08 RX ADMIN — DRONABINOL 5 MG: 2.5 CAPSULE ORAL at 21:14

## 2023-01-08 RX ADMIN — PANTOPRAZOLE SODIUM 40 MG: 40 TABLET, DELAYED RELEASE ORAL at 09:40

## 2023-01-08 RX ADMIN — ENOXAPARIN SODIUM 30 MG: 100 INJECTION SUBCUTANEOUS at 09:39

## 2023-01-08 RX ADMIN — GABAPENTIN 300 MG: 300 CAPSULE ORAL at 09:40

## 2023-01-08 RX ADMIN — DEXAMETHASONE SODIUM PHOSPHATE 4 MG: 4 INJECTION, SOLUTION INTRAMUSCULAR; INTRAVENOUS at 03:11

## 2023-01-08 ASSESSMENT — PAIN DESCRIPTION - FREQUENCY
FREQUENCY: INTERMITTENT
FREQUENCY: INTERMITTENT

## 2023-01-08 ASSESSMENT — PAIN DESCRIPTION - ONSET
ONSET: ON-GOING
ONSET: ON-GOING

## 2023-01-08 ASSESSMENT — PAIN DESCRIPTION - LOCATION
LOCATION: GENERALIZED
LOCATION: GENERALIZED

## 2023-01-08 ASSESSMENT — PAIN DESCRIPTION - PAIN TYPE
TYPE: ACUTE PAIN
TYPE: ACUTE PAIN

## 2023-01-08 ASSESSMENT — PAIN DESCRIPTION - DESCRIPTORS
DESCRIPTORS: ACHING
DESCRIPTORS: ACHING

## 2023-01-08 ASSESSMENT — PAIN SCALES - GENERAL
PAINLEVEL_OUTOF10: 3
PAINLEVEL_OUTOF10: 0
PAINLEVEL_OUTOF10: 3

## 2023-01-08 NOTE — PROGRESS NOTES
BP 92/62   Pulse 68   Temp 96.8 °F (36 °C) (Oral)   Resp 20   Ht 5' 11\" (1.803 m)   Wt 150 lb (68 kg)   SpO2 91%   BMI 20.92 kg/m²     Assessment complete. Meds passed. Pt denies needs at this time    Patient denies pain this morning. Patient only ate 25% of breakfast.     Bedside Mobility Assessment Tool (BMAT):     Assessment Level 1- Sit and Shake    1. From a semi-reclined position, ask patient to sit up and rotate to a seated position at the side of the bed. Can use the bedrail. 2. Ask patient to reach out and grab your hand and shake making sure patient reaches across his/her midline. Pass- Patient is able to come to a seated position, maintain core strength. Maintains seated balance while reaching across midline. Move on to Assessment Level 2. Assessment Level 2- Stretch and Point   1. With patient in seated position at the side of the bed, have patient place both feet on the floor (or stool) with knees no higher than hips. 2. Ask patient to stretch one leg and straighten the knee, then bend the ankle/flex and point the toes. If appropriate, repeat with the other leg. Pass- Patient is able to demonstrate appropriate quad strength on intended weight bearing limb(s). Move onto Assessment Level 3. Assessment Level 3- Stand   1. Ask patient to elevate off the bed or chair (seated to standing) using an assistive device (cane, bedrail). 2. Patient should be able to raise buttocks off be and hold for a count of five. May repeat once. Pass- Patient maintains standing stability for at least 5 seconds, proceed to assessment level 4. Assessment Level 4- Walk   1. Ask patient to march in place at bedside. 2. Then ask patient to advance step and return each foot. Some medical conditions may render a patient from stepping backwards, use your best clinical judgement.    Pass- Patient demonstrates balance while shifting weight and ability to step, takes independent steps, does not use assistive device patient is MOBILITY LEVEL 4.       Mobility Level- 4

## 2023-01-08 NOTE — PROGRESS NOTES
RT Inhaler-Nebulizer Bronchodilator Protocol Note    There is a bronchodilator order in the chart from a provider indicating to follow the RT Bronchodilator Protocol and there is an Initiate RT Inhaler-Nebulizer Bronchodilator Protocol order as well (see protocol at bottom of note). CXR Findings:  XR CHEST PORTABLE    Result Date: 1/6/2023  1. No acute cardiopulmonary findings. 2.  Stable appearance of the known right upper lobe cavitary mass. The findings from the last RT Protocol Assessment were as follows:   History Pulmonary Disease: Chronic pulmonary disease  Respiratory Pattern: Dyspnea on exertion or RR 21-25 bpm  Breath Sounds: Slightly diminished and/or crackles  Cough: Strong, spontaneous, non-productive  Indication for Bronchodilator Therapy: Decreased or absent breath sounds  Bronchodilator Assessment Score: 6    Aerosolized bronchodilator medication orders have been revised according to the RT Inhaler-Nebulizer Bronchodilator Protocol below. Respiratory Therapist to perform RT Therapy Protocol Assessment initially then follow the protocol. Repeat RT Therapy Protocol Assessment PRN for score 0-3 or on second treatment, BID, and PRN for scores above 3. No Indications - adjust the frequency to every 6 hours PRN wheezing or bronchospasm, if no treatments needed after 48 hours then discontinue using Per Protocol order mode. If indication present, adjust the RT bronchodilator orders based on the Bronchodilator Assessment Score as indicated below. Use Inhaler orders unless patient has one or more of the following: on home nebulizer, not able to hold breath for 10 seconds, is not alert and oriented, cannot activate and use MDI correctly, or respiratory rate 25 breaths per minute or more, then use the equivalent nebulizer order(s) with same Frequency and PRN reasons based on the score.   If a patient is on this medication at home then do not decrease Frequency below that used at home.    0-3 - enter or revise RT bronchodilator order(s) to equivalent RT Bronchodilator order with Frequency of every 4 hours PRN for wheezing or increased work of breathing using Per Protocol order mode. 4-6 - enter or revise RT Bronchodilator order(s) to two equivalent RT bronchodilator orders with one order with BID Frequency and one order with Frequency of every 4 hours PRN wheezing or increased work of breathing using Per Protocol order mode. 7-10 - enter or revise RT Bronchodilator order(s) to two equivalent RT bronchodilator orders with one order with TID Frequency and one order with Frequency of every 4 hours PRN wheezing or increased work of breathing using Per Protocol order mode. 11-13 - enter or revise RT Bronchodilator order(s) to one equivalent RT bronchodilator order with QID Frequency and an Albuterol order with Frequency of every 4 hours PRN wheezing or increased work of breathing using Per Protocol order mode. Greater than 13 - enter or revise RT Bronchodilator order(s) to one equivalent RT bronchodilator order with every 4 hours Frequency and an Albuterol order with Frequency of every 2 hours PRN wheezing or increased work of breathing using Per Protocol order mode. RT to enter RT Home Evaluation for COPD & MDI Assessment order using Per Protocol order mode.     Electronically signed by Britta Tenorio RCP on 1/8/2023 at 8:38 AM

## 2023-01-08 NOTE — FLOWSHEET NOTE
01/07/23 2123   Vital Signs   Temp 97.2 °F (36.2 °C)   Temp Source Oral   Heart Rate 69   Heart Rate Source Monitor   Resp 20   BP 98/65   MAP (Calculated) 76   BP Location Right upper arm   Patient Position Semi fowlers   Level of Consciousness 0   MEWS Score 2   Oxygen Therapy   SpO2 93 %   O2 Device High flow nasal cannula   O2 Flow Rate (L/min) 4 L/min   Assessment complete- see flowsheets. Pt resting in bed, PRN meds given per pt request within PRN order parameters, pt aware to call for further needs or changes. Call light within reach.  Will continue to monitor.  Radha Shannon RN

## 2023-01-08 NOTE — PROGRESS NOTES
01/07/23 2005   RT Protocol   History Pulmonary Disease 2   Respiratory pattern 2   Breath sounds 2   Cough 0   Indications for Bronchodilator Therapy Decreased or absent breath sounds   Bronchodilator Assessment Score 6   RT Inhaler-Nebulizer Bronchodilator Protocol Note    There is a bronchodilator order in the chart from a provider indicating to follow the RT Bronchodilator Protocol and there is an Initiate RT Inhaler-Nebulizer Bronchodilator Protocol order as well (see protocol at bottom of note). CXR Findings:  XR CHEST PORTABLE    Result Date: 1/6/2023  1. No acute cardiopulmonary findings. 2.  Stable appearance of the known right upper lobe cavitary mass. The findings from the last RT Protocol Assessment were as follows:   History Pulmonary Disease: Chronic pulmonary disease  Respiratory Pattern: Dyspnea on exertion or RR 21-25 bpm  Breath Sounds: Slightly diminished and/or crackles  Cough: Strong, spontaneous, non-productive  Indication for Bronchodilator Therapy: Decreased or absent breath sounds  Bronchodilator Assessment Score: 6    Aerosolized bronchodilator medication orders have been revised according to the RT Inhaler-Nebulizer Bronchodilator Protocol below. Respiratory Therapist to perform RT Therapy Protocol Assessment initially then follow the protocol. Repeat RT Therapy Protocol Assessment PRN for score 0-3 or on second treatment, BID, and PRN for scores above 3. No Indications - adjust the frequency to every 6 hours PRN wheezing or bronchospasm, if no treatments needed after 48 hours then discontinue using Per Protocol order mode. If indication present, adjust the RT bronchodilator orders based on the Bronchodilator Assessment Score as indicated below.   Use Inhaler orders unless patient has one or more of the following: on home nebulizer, not able to hold breath for 10 seconds, is not alert and oriented, cannot activate and use MDI correctly, or respiratory rate 25 breaths per minute or more, then use the equivalent nebulizer order(s) with same Frequency and PRN reasons based on the score. If a patient is on this medication at home then do not decrease Frequency below that used at home. 0-3 - enter or revise RT bronchodilator order(s) to equivalent RT Bronchodilator order with Frequency of every 4 hours PRN for wheezing or increased work of breathing using Per Protocol order mode. 4-6 - enter or revise RT Bronchodilator order(s) to two equivalent RT bronchodilator orders with one order with BID Frequency and one order with Frequency of every 4 hours PRN wheezing or increased work of breathing using Per Protocol order mode. 7-10 - enter or revise RT Bronchodilator order(s) to two equivalent RT bronchodilator orders with one order with TID Frequency and one order with Frequency of every 4 hours PRN wheezing or increased work of breathing using Per Protocol order mode. 11-13 - enter or revise RT Bronchodilator order(s) to one equivalent RT bronchodilator order with QID Frequency and an Albuterol order with Frequency of every 4 hours PRN wheezing or increased work of breathing using Per Protocol order mode. Greater than 13 - enter or revise RT Bronchodilator order(s) to one equivalent RT bronchodilator order with every 4 hours Frequency and an Albuterol order with Frequency of every 2 hours PRN wheezing or increased work of breathing using Per Protocol order mode.        Electronically signed by Alphonso Aguila RCP on 1/7/2023 at 8:08 PM

## 2023-01-08 NOTE — PROGRESS NOTES
IM Progress Note    Admit Date:  1/3/2023    Patient with history of COPD and lung cancer presenting with increased shortness of breath, admitted for exacerbation of COPD, COVID-positive. Patient normally on oxygen 1 to 2 L, required O2 up to 3 L on presentation  Worsening hypoxemia-O2 requirement progressively up to 7 L    Patient is starting to feel better the last couple of days , hypoxemia improving       Subjective:  Mr. Boby Rey looks and feels much better. O2 requirement was weaned down to 3.5 L; overnight was up to 6 L, back down to 4 L today. Oxygen saturations maintained on 4 L now. Shortness of breath better. Decreased cough . He is in no distress. No fevers or chills. Patient asking about going home. Discussed with patient that O2 requirement needs to be down to 1 to 2 L, close to his baseline, and he should not have nocturnal desaturation prior to discharge. Monitor O2 sats tonight and hopefully can be weaned some more tomorrow      Objective:   BP 95/60   Pulse 67   Temp (!) 96.5 °F (35.8 °C) (Oral)   Resp 20   Ht 5' 11\" (1.803 m)   Wt 150 lb (68 kg)   SpO2 90%   BMI 20.92 kg/m²     Intake/Output Summary (Last 24 hours) at 1/8/2023 1638  Last data filed at 1/8/2023 3754  Gross per 24 hour   Intake 720 ml   Output 275 ml   Net 445 ml           Physical Exam:  General:  Awake, alert, NAD  Skin:  Warm and dry  Neck:  JVD absent. Neck supple  Chest: Diminished breath sounds. No wheezes , no rales or rhonchi. Cardiovascular:  RRR ,S1S2 normal  Abdomen:  Soft, non tender, non distended, BS +  Extremities:  No edema. Intact peripheral pulses.  Brisk cap refill, < 2 secs  Neuro: non focal      Medications:   Scheduled Meds:   dexamethasone  4 mg IntraVENous Q12H    dronabinol  5 mg Oral BID    sodium chloride flush  5-40 mL IntraVENous 2 times per day    enoxaparin  30 mg SubCUTAneous BID    pantoprazole  40 mg Oral Daily    gabapentin  300 mg Oral TID    albuterol-ipratropium  1 puff Inhalation 4x daily       Continuous Infusions:   sodium chloride         Data:  CBC:   Recent Labs     01/06/23  0536   WBC 3.9*   RBC 3.81*   HGB 12.3*   HCT 36.7*   MCV 96.4   RDW 18.4*          BMP:   Recent Labs     01/06/23  0536      K 4.3      CO2 26   BUN 12   CREATININE <0.5*       BNP: No results for input(s): BNP in the last 72 hours. PT/INR: No results for input(s): PROTIME, INR in the last 72 hours. APTT: No results for input(s): APTT in the last 72 hours. CARDIAC ENZYMES:   No results for input(s): CKMB, CKMBINDEX, TROPONINI in the last 72 hours. Invalid input(s): CKTOTAL;3    FASTING LIPID PANEL:No results found for: CHOL, HDL, TRIG  LIVER PROFILE:   Recent Labs     01/06/23  0536   AST 30   ALT 27   BILITOT 0.5   ALKPHOS 105          Cultures  Covid positive  Influenza not detected        Radiology  XR CHEST PORTABLE   Final Result   1. No acute cardiopulmonary findings. 2.  Stable appearance of the known right upper lobe cavitary mass. CT CHEST PULMONARY EMBOLISM W CONTRAST   Final Result   1. Negative for pulmonary embolus. 2. Cavitary 6 cm right upper lobe bronchogenic carcinoma with associated   rightward T2, T3 and T4 vertebral body/costal invasion. 3. Moderate to severe pathologic T3 compression fracture. Assessment:  Principal Problem:    Acute and chronic respiratory failure with hypoxia (HCC)  Active Problems:    Acute exacerbation of chronic obstructive pulmonary disease (COPD) (HCC)    Lung cancer (HCC)    COVID-19    Hypomagnesemia    Hypokalemia    Acute on chronic respiratory failure with hypoxemia (HCC)  Resolved Problems:    * No resolved hospital problems. *      Plan:    #Acute hypoxic respiratory failure  #COPD AE  #COVID  -presented with increasing SOB and productive cough x1 week  -baseline O2 requirement 1L; patient was requiring oxygen 3 L on admission.    He developed worsening hypoxemia with increasing O2 requirement, progressively up to 7 L. Continued supplemental oxygen and weaned as tolerated-> O2 weaned down to 4 L today  -CTPA negative   -Continue Decadron , dose increased to 6 mg IV twice daily on 1/5 ; currently dose adjusted to 4 mg IV twice daily. Continue current dosing, hypoxemia improving  -Continue inhaled bronchodilators. -Pulmonology following   -droplet plus precautions     #Lung cancer  -s/p chemo and radiation  -follows w/ [facility]     #Hypomagnesemia  #Hypokalemia  -repleted   -monitor      #HTN  -BP low on admission  -hold lisinopril and amlodipine for now    BP stable      #GERD  -continue PPI     # Anxiety   -Check home medication-could not clarify home medication. Will use Ativan as needed for anxiety     DVT Prophylaxis: Lovenox  ADULT DIET; Regular  ADULT ORAL NUTRITION SUPPLEMENT; Breakfast, Lunch, Dinner; Standard High Calorie/High Protein Oral Supplement   Full Code      Patient slowly improving daily; wean oxygen as tolerated.    Plan for discharge home; if O2 requirement is back to his baseline of 1 to 2 L      Carlos Mccloud MD   1/8/2023 4:38 PM

## 2023-01-08 NOTE — PLAN OF CARE
Problem: Safety - Adult  Goal: Free from fall injury  1/7/2023 1956 by Diego Ortiz RN  Outcome: Progressing  1/7/2023 1153 by Jinny Morataya RN  Outcome: Progressing     Problem: Nutrition Deficit:  Goal: Optimize nutritional status  1/7/2023 1956 by Diego Ortiz RN  Outcome: Progressing  1/7/2023 1153 by Jinny Morataya RN  Outcome: Progressing     Problem: Pain  Goal: Verbalizes/displays adequate comfort level or baseline comfort level  1/7/2023 1956 by Diego Ortiz RN  Outcome: Progressing  1/7/2023 1153 by Jinny Morataya RN  Outcome: Progressing

## 2023-01-08 NOTE — PROGRESS NOTES
PULMONARY PROGRESS NOTE  CC: Increasing shortness of breath x1 week, admitted for COPD exacerbation, COVID positive    Subjective:   O2 was weaned yesterday but subsequently required up to 6 L. He is currently down to 4 L. IV line: Peripheral    PHYSICAL EXAM:   /74   Pulse 65   Temp 97.2 °F (36.2 °C) (Oral)   Resp 20   Ht 5' 11\" (1.803 m)   Wt 150 lb (68 kg)   SpO2 94%   BMI 20.92 kg/m² ' on 6 L  Constitutional:  No acute distress   HEENT:  No scleral icterus  Neck:  No tracheal deviation present. Cardiovascular:  Normal heart sounds. Pulmonary/Chest:  No wheezes. No rhonchi. No rales. ++ decreased breath sounds. No accessory muscle usage or stridor. Abdominal:  Soft. Musculoskeletal:  No cyanosis. No clubbing. Skin:  Skin is warm and dry. Scheduled Meds:   dexamethasone  4 mg IntraVENous Q12H    dronabinol  5 mg Oral BID    sodium chloride flush  5-40 mL IntraVENous 2 times per day    enoxaparin  30 mg SubCUTAneous BID    pantoprazole  40 mg Oral Daily    gabapentin  300 mg Oral TID    albuterol-ipratropium  1 puff Inhalation 4x daily     Continuous Infusions:   sodium chloride       PRN Meds:  LORazepam, sodium chloride flush, sodium chloride, ondansetron **OR** ondansetron, polyethylene glycol, acetaminophen **OR** acetaminophen, potassium chloride **OR** potassium alternative oral replacement **OR** potassium chloride, magnesium sulfate, albuterol-ipratropium    Labs:  CBC:   Recent Labs     01/06/23  0536   WBC 3.9*   HGB 12.3*   HCT 36.7*   MCV 96.4        BMP:   Recent Labs     01/06/23  0536      K 4.3      CO2 26   BUN 12   CREATININE <0.5*     Micro:   1/3/23 SARS-CoV-2 DETECTED    Procalcitonin 0.08 > 0.08  CRP 10.8  proBNP 119    Imaging: Chest imaging was reviewed by me and showed:  CTPA 1/3/2023  1. Negative for pulmonary embolus.    2. Cavitary 6 cm right upper lobe bronchogenic carcinoma with associated   rightward T2, T3 and T4 vertebral body/costal invasion. 3. Moderate to severe pathologic T3 compression fracture. CXR 1/6/2023  1. No acute cardiopulmonary findings. 2.  Stable appearance of the known right upper lobe cavitary mass. ASSESSMENT:  Acute on chronic hypoxemic & hypercapnic respiratory failure; baseline 2 - 2.5 L O2  COVID pneumonia  COPD with acute exacerbation  Metastatic non-small cell lung cancer, f/w [facility] patient    PLAN:  COVID-19 isolation, droplet plus  Supplemental oxygen to maintain SaO2 >92%; wean as tolerated    Inhaled bronchodilators   Steroid D#6, Decadron 4 mg BID   Prophylaxis: Lovenox    I spent a total of 10 minutes on this encounter personally obtaining the patient history, reviewing labs, imaging and other data. I independently performed the above physical exam and updated this encounter note, assessment and plan as needed. Desean Castillo MD on 1/8/23 at 11:55 AM EST    I spent a total of 6 minutes on this encounter on chart review, history taking and review of data. I updated this encounter note as needed.    Alfredito Fuller PA-C on 1/8/23 at 6:50 AM EST

## 2023-01-09 VITALS
RESPIRATION RATE: 18 BRPM | DIASTOLIC BLOOD PRESSURE: 74 MMHG | HEIGHT: 71 IN | SYSTOLIC BLOOD PRESSURE: 112 MMHG | WEIGHT: 150 LBS | OXYGEN SATURATION: 92 % | TEMPERATURE: 98.2 F | HEART RATE: 60 BPM | BODY MASS INDEX: 21 KG/M2

## 2023-01-09 PROBLEM — J44.1 CHRONIC OBSTRUCTIVE PULMONARY DISEASE WITH ACUTE EXACERBATION (HCC): Status: ACTIVE | Noted: 2023-01-09

## 2023-01-09 PROCEDURE — 2700000000 HC OXYGEN THERAPY PER DAY

## 2023-01-09 PROCEDURE — 6370000000 HC RX 637 (ALT 250 FOR IP): Performed by: INTERNAL MEDICINE

## 2023-01-09 PROCEDURE — 6370000000 HC RX 637 (ALT 250 FOR IP)

## 2023-01-09 PROCEDURE — 6360000002 HC RX W HCPCS

## 2023-01-09 PROCEDURE — 94640 AIRWAY INHALATION TREATMENT: CPT

## 2023-01-09 PROCEDURE — 99232 SBSQ HOSP IP/OBS MODERATE 35: CPT | Performed by: INTERNAL MEDICINE

## 2023-01-09 PROCEDURE — 94761 N-INVAS EAR/PLS OXIMETRY MLT: CPT

## 2023-01-09 PROCEDURE — 6360000002 HC RX W HCPCS: Performed by: INTERNAL MEDICINE

## 2023-01-09 PROCEDURE — 2580000003 HC RX 258

## 2023-01-09 PROCEDURE — 99239 HOSP IP/OBS DSCHRG MGMT >30: CPT | Performed by: INTERNAL MEDICINE

## 2023-01-09 RX ORDER — DEXAMETHASONE 2 MG/1
TABLET ORAL
Qty: 30 TABLET | Refills: 0 | Status: SHIPPED | OUTPATIENT
Start: 2023-01-09

## 2023-01-09 RX ADMIN — ENOXAPARIN SODIUM 30 MG: 100 INJECTION SUBCUTANEOUS at 08:29

## 2023-01-09 RX ADMIN — ACETAMINOPHEN 650 MG: 325 TABLET ORAL at 03:24

## 2023-01-09 RX ADMIN — GABAPENTIN 300 MG: 300 CAPSULE ORAL at 08:28

## 2023-01-09 RX ADMIN — PANTOPRAZOLE SODIUM 40 MG: 40 TABLET, DELAYED RELEASE ORAL at 08:28

## 2023-01-09 RX ADMIN — DRONABINOL 5 MG: 2.5 CAPSULE ORAL at 08:28

## 2023-01-09 RX ADMIN — DEXAMETHASONE SODIUM PHOSPHATE 4 MG: 4 INJECTION, SOLUTION INTRAMUSCULAR; INTRAVENOUS at 03:24

## 2023-01-09 RX ADMIN — LORAZEPAM 0.5 MG: 0.5 TABLET ORAL at 03:24

## 2023-01-09 RX ADMIN — Medication 10 ML: at 08:29

## 2023-01-09 ASSESSMENT — PAIN DESCRIPTION - DESCRIPTORS: DESCRIPTORS: ACHING

## 2023-01-09 ASSESSMENT — PAIN DESCRIPTION - LOCATION
LOCATION: GENERALIZED
LOCATION: GENERALIZED

## 2023-01-09 ASSESSMENT — PAIN DESCRIPTION - ONSET: ONSET: ON-GOING

## 2023-01-09 ASSESSMENT — PAIN DESCRIPTION - PAIN TYPE: TYPE: ACUTE PAIN

## 2023-01-09 ASSESSMENT — PAIN - FUNCTIONAL ASSESSMENT: PAIN_FUNCTIONAL_ASSESSMENT: ACTIVITIES ARE NOT PREVENTED

## 2023-01-09 ASSESSMENT — PAIN SCALES - GENERAL
PAINLEVEL_OUTOF10: 2
PAINLEVEL_OUTOF10: 3

## 2023-01-09 ASSESSMENT — PAIN DESCRIPTION - FREQUENCY: FREQUENCY: INTERMITTENT

## 2023-01-09 NOTE — PROGRESS NOTES
O2 Sat at rest on room air is 88 %. O2 Sat at rest with oxygen @  3 lpm is 93%. O2 Sat with activity with oxygen @ 3 lpm is 90%.

## 2023-01-09 NOTE — FLOWSHEET NOTE
01/08/23 2105   Vital Signs   Temp 96.8 °F (36 °C)   Temp Source Oral   Heart Rate 65   Heart Rate Source Monitor   Resp 20   /76   MAP (Calculated) 87   BP Location Right upper arm   Patient Position Semi fowlers   Level of Consciousness 0   MEWS Score 1   Oxygen Therapy   SpO2 94 %   O2 Device High flow nasal cannula   O2 Flow Rate (L/min) 4 L/min   Assessment complete- see flowsheets. Pt resting in bed, PRN meds given with nightly meds per pt request within PRN order parameters- see flowsheets and eMAR for associated medication administration information. Call light within reach, pt aware to call for any needs or changes and denies further needs at this time. Will continue to monitor.   Nannette Hugo RN

## 2023-01-09 NOTE — PROGRESS NOTES
RT Inhaler-Nebulizer Bronchodilator Protocol Note    There is a bronchodilator order in the chart from a provider indicating to follow the RT Bronchodilator Protocol and there is an Initiate RT Inhaler-Nebulizer Bronchodilator Protocol order as well (see protocol at bottom of note). CXR Findings:  No results found. The findings from the last RT Protocol Assessment were as follows:   History Pulmonary Disease: Chronic pulmonary disease  Respiratory Pattern: Dyspnea on exertion or RR 21-25 bpm  Breath Sounds: Slightly diminished and/or crackles  Cough: Strong, spontaneous, non-productive  Indication for Bronchodilator Therapy: Decreased or absent breath sounds  Bronchodilator Assessment Score: 6    Aerosolized bronchodilator medication orders have been revised according to the RT Inhaler-Nebulizer Bronchodilator Protocol below. Respiratory Therapist to perform RT Therapy Protocol Assessment initially then follow the protocol. Repeat RT Therapy Protocol Assessment PRN for score 0-3 or on second treatment, BID, and PRN for scores above 3. No Indications - adjust the frequency to every 6 hours PRN wheezing or bronchospasm, if no treatments needed after 48 hours then discontinue using Per Protocol order mode. If indication present, adjust the RT bronchodilator orders based on the Bronchodilator Assessment Score as indicated below. Use Inhaler orders unless patient has one or more of the following: on home nebulizer, not able to hold breath for 10 seconds, is not alert and oriented, cannot activate and use MDI correctly, or respiratory rate 25 breaths per minute or more, then use the equivalent nebulizer order(s) with same Frequency and PRN reasons based on the score. If a patient is on this medication at home then do not decrease Frequency below that used at home.     0-3 - enter or revise RT bronchodilator order(s) to equivalent RT Bronchodilator order with Frequency of every 4 hours PRN for wheezing or increased work of breathing using Per Protocol order mode. 4-6 - enter or revise RT Bronchodilator order(s) to two equivalent RT bronchodilator orders with one order with BID Frequency and one order with Frequency of every 4 hours PRN wheezing or increased work of breathing using Per Protocol order mode. 7-10 - enter or revise RT Bronchodilator order(s) to two equivalent RT bronchodilator orders with one order with TID Frequency and one order with Frequency of every 4 hours PRN wheezing or increased work of breathing using Per Protocol order mode. 11-13 - enter or revise RT Bronchodilator order(s) to one equivalent RT bronchodilator order with QID Frequency and an Albuterol order with Frequency of every 4 hours PRN wheezing or increased work of breathing using Per Protocol order mode. Greater than 13 - enter or revise RT Bronchodilator order(s) to one equivalent RT bronchodilator order with every 4 hours Frequency and an Albuterol order with Frequency of every 2 hours PRN wheezing or increased work of breathing using Per Protocol order mode.        Electronically signed by Evelyn Thompson RCP on 1/8/2023 at 7:31 PM

## 2023-01-09 NOTE — PROGRESS NOTES
Patient IV removed per discharge order. Tele removed per D/C order; [facility] notified. Patient educated on D/C and to bring oxygen take to hosp for discharge. Patient verbalized understanding.

## 2023-01-09 NOTE — DISCHARGE SUMMARY
Name:  Carlie Holcomb  Room:  0205/0205-02  MRN:    [id number]    Discharge Summary      This discharge summary is in conjunction with a complete physical exam done on the day of discharge. Discharging Physician: Ceci Stephens MD      Admit: 1/3/2023  Discharge:  1/9/2023    Diagnoses this Admission    Principal Problem:    Acute on chronic respiratory failure with hypoxia (HCC)  Active Problems:    Acute exacerbation of chronic obstructive pulmonary disease (COPD) ([address]    Lung cancer (HCC)    COVID-19    Hypomagnesemia    Hypokalemia    Acute on chronic respiratory failure with hypoxemia (HCC)    Chronic obstructive pulmonary disease with acute exacerbation (HCC)  Resolved Problems:    * No resolved hospital problems. *          Procedures (Please Review Full Report for Details)      Consults    IP CONSULT TO PULMONOLOGY      HPI:  The patient is a 74 y.o. male with COPD and lung cancer who was referred to [facility] ED ED by his oncologist regarding concern of increased SOB x2 weeks. States this is especially worse with exertion. He also has a productive cough, slightly worse than his baseline cough. He reports compliance with home COPD medications. Denies associated fever, chills, nausea, vomiting, constipation, abdominal pain, or chest pain. He does endorse diarrhea ongoing for the last week, approximately 3-5 loose bowel movements per day. Denies blood in stool. Found to have acute on chronic respiratory failure 2/2 Covid and COPD exacerbation. Hospital Course    #Acute on chronic hypoxic respiratory failure  #COPD AE  #COVID  -presented with increasing SOB and productive cough x1 week  -baseline O2 requirement 1L; patient was requiring oxygen 3 L on admission. He developed worsening hypoxemia with increasing O2 requirement, progressively up to 7 L.  Continued supplemental oxygen and weaned as tolerated-> O2 weaned down to 3 L today  -CTPA negative   -Continue Decadron , dose increased to 6 mg IV twice daily on 1/5 ; currently dose adjusted to 4 mg IV twice daily. Continue current dosing, hypoxemia improving  -Continue inhaled bronchodilators. -Pulmonology following   -droplet plus precautions     #Lung cancer  -s/p chemo and radiation  -follows w/ [facility]     #Hypomagnesemia  #Hypokalemia  -repleted   -monitor      #HTN  -BP low on admission  -hold lisinopril and amlodipine for now    BP stable      #GERD  -continue PPI      # Anxiety   -Check home medication-could not clarify home medication. Will use Ativan as needed for anxiety      XR CHEST PORTABLE   Final Result   1. No acute cardiopulmonary findings. 2.  Stable appearance of the known right upper lobe cavitary mass. CT CHEST PULMONARY EMBOLISM W CONTRAST   Final Result   1. Negative for pulmonary embolus. 2. Cavitary 6 cm right upper lobe bronchogenic carcinoma with associated   rightward T2, T3 and T4 vertebral body/costal invasion. 3. Moderate to severe pathologic T3 compression fracture.                 Discharge Medications     Medication List        CHANGE how you take these medications      dexamethasone 2 MG tablet  Commonly known as: DECADRON  2 bid- 4 days, 1 bid- 4 days, 1 qd- 4 days, 1/2 qd- 2 days  What changed:   how much to take  how to take this  when to take this  additional instructions            CONTINUE taking these medications      budesonide-formoterol 160-4.5 MCG/ACT Aero  Commonly known as: SYMBICORT     dronabinol 5 MG capsule  Commonly known as: MARINOL     gabapentin 300 MG capsule  Commonly known as: NEURONTIN     morphine 30 MG extended release tablet  Commonly known as: MS CONTIN     ondansetron 8 MG tablet  Commonly known as: ZOFRAN     oxyCODONE 5 MG immediate release tablet  Commonly known as: ROXICODONE     pantoprazole 40 MG tablet  Commonly known as: PROTONIX            STOP taking these medications      amLODIPine 10 MG tablet  Commonly known as: NORVASC     lisinopril 30 MG tablet  Commonly known as: PRINIVIL;ZESTRIL     prochlorperazine 10 MG tablet  Commonly known as: COMPAZINE               Where to Get Your Medications        These medications were sent to [address] [place] [address] -  [phone] - F [phone]  [address] [place] [address] [place] [address]      Phone: [phone]   dexamethasone 2 MG tablet           Discharge Condition/Location: Stable    Follow Up: Follow up with PCP.     Total time spent on discharge is 35 minutes        Johny Prader, MD 1/9/2023 9:59 AM

## 2023-01-09 NOTE — CARE COORDINATION
[facility]    Referral received from CM to follow for home care services. I will follow for needs, and speak with patient to verify demos.   Pending auth from [place]    Order/Referral needs sent to [place] and I will follow up on auth request  CM to call [phone] ask for triage nurse  [facility]  Fax: order; facesheet; h&p; avs; dc summary to triage nurse/[facility] p[facility]  Let them know patient requesting [facility] for home care    Written auth needs faxed to [facility] [phone]     [facility] will call [place] home care coordinator to follow up on auth request  [phone] Abdifatah Bond     For additional assistance can reach out to [address] [address] Supervisor Keren Garcia [phone]       3pm auth received; referral faxed Shan Pack RN, BSN CTN  [facility] [phone]

## 2023-01-09 NOTE — PLAN OF CARE
Problem: Discharge Planning  Goal: Discharge to home or other facility with appropriate resources  Outcome: Completed  Flowsheets (Taken 1/9/2023 0800)  Discharge to home or other facility with appropriate resources: Identify barriers to discharge with patient and caregiver     Problem: Safety - Adult  Goal: Free from fall injury  Outcome: Completed     Problem: Nutrition Deficit:  Goal: Optimize nutritional status  Outcome: Completed     Problem: Pain  Goal: Verbalizes/displays adequate comfort level or baseline comfort level  Outcome: Completed  Flowsheets (Taken 1/9/2023 0826)  Verbalizes/displays adequate comfort level or baseline comfort level: Encourage patient to monitor pain and request assistance

## 2023-01-09 NOTE — PROGRESS NOTES
AM Shift assessment completed. Pt is alert and oriented. Vital signs are WNL. Respirations are even & easy. Patient on 3 L 02 at this time; SAT 92%. Will continue to monitor. No SOB noted. No complaints voiced. Pt denies needs at this time. SR up x 2 and bed in low position. Call light is within reach. Will monitor. .Bedside Mobility Assessment Tool (BMAT):     Assessment Level 1- Sit and Shake    1. From a semi-reclined position, ask patient to sit up and rotate to a seated position at the side of the bed. Can use the bedrail. 2. Ask patient to reach out and grab your hand and shake making sure patient reaches across his/her midline. Pass- Patient is able to come to a seated position, maintain core strength. Maintains seated balance while reaching across midline. Move on to Assessment Level 2. Assessment Level 2- Stretch and Point   1. With patient in seated position at the side of the bed, have patient place both feet on the floor (or stool) with knees no higher than hips. 2. Ask patient to stretch one leg and straighten the knee, then bend the ankle/flex and point the toes. If appropriate, repeat with the other leg. Pass- Patient is able to demonstrate appropriate quad strength on intended weight bearing limb(s). Move onto Assessment Level 3. Assessment Level 3- Stand   1. Ask patient to elevate off the bed or chair (seated to standing) using an assistive device (cane, bedrail). 2. Patient should be able to raise buttocks off be and hold for a count of five. May repeat once. Pass- Patient maintains standing stability for at least 5 seconds, proceed to assessment level 4. Assessment Level 4- Walk   1. Ask patient to march in place at bedside. 2. Then ask patient to advance step and return each foot. Some medical conditions may render a patient from stepping backwards, use your best clinical judgement.    Pass- Patient demonstrates balance while shifting weight and ability to step, takes independent steps, does not use assistive device patient is MOBILITY LEVEL 4.       Mobility Level- 4

## 2023-01-09 NOTE — DISCHARGE INSTR - COC
Continuity of Care Form    Patient Name: Zac Gracia   :  1950  MRN:  [id number]    Admit date:  1/3/2023  Discharge date:  2023    Code Status Order: Full Code   Advance Directives:     Admitting Physician:  Maco Macedo MD  PCP: Iban Maldonado [facility]    Discharging Nurse: [facility]/Room#: 0205/0205-02  Discharging Unit Phone Number: ***    Emergency Contact:   Extended Emergency Contact Information  Primary Emergency Contact: [address] [place] [address] Court Phone: [phone]  Mobile Phone: [phone]  Relation: Spouse    Past Surgical History:  History reviewed. No pertinent surgical history. Immunization History: There is no immunization history on file for this patient.     Active Problems:  Patient Active Problem List   Diagnosis Code    Acute on chronic respiratory failure with hypoxia ([facility]) J96.21    Acute exacerbation of chronic obstructive pulmonary disease (COPD) ([facility]) J44.1    Lung cancer ([facility]) C34.90    COVID-19 U07.1    Hypomagnesemia E83.42    Hypokalemia E87.6    Acute on chronic respiratory failure with hypoxemia ([facility]) J96.21    Chronic obstructive pulmonary disease with acute exacerbation ([facility]) J44.1       Isolation/Infection:   Isolation            Droplet Plus          Patient Infection Status       Infection Onset Added Last Indicated Last Indicated By Review Planned Expiration Resolved Resolved By    COVID-19 23 COVID-19 & Influenza Combo 23      Resolved    COVID-19 (Rule Out) 23 COVID-19 & Influenza Combo (Ordered)   23 Rule-Out Test Resulted            Nurse Assessment:  Last Vital Signs: /74   Pulse 60   Temp 98.2 °F (36.8 °C) (Oral)   Resp 18   Ht 5' 11\" (1.803 m)   Wt 150 lb (68 kg)   SpO2 92%   BMI 20.92 kg/m²     Last documented pain score (0-10 scale): Pain Level: 2  Last Weight:   Wt Readings from Last 1 Encounters:   23 150 lb (68 kg)     Mental Status:  {IP PT MENTAL STATUS:}    IV Access:  { VASU IV ACCESS:484676297}    Nursing Mobility/ADLs:  Walking   {CHP DME MMXA:457111371}  Transfer  {CHP DME YFEO:229913668}  Bathing  {CHP DME WBAE:959924341}  Dressing  {CHP DME IDA}  Toileting  {CHP DME GSSF:262786101}  Feeding  {CHP DME VTFQ:420829680}  Med Admin  {P DME BWXX:867061923}  Med Delivery   { VASU MED Delivery:439938986}    Wound Care Documentation and Therapy:        Elimination:  Continence: Bowel: {YES / XR:11202}  Bladder: {YES / UP:37706}  Urinary Catheter: {Urinary Catheter:219431901}   Colostomy/Ileostomy/Ileal Conduit: {YES / VM:53377}       Date of Last BM: ***    Intake/Output Summary (Last 24 hours) at 2023 1145  Last data filed at 2023 0318  Gross per 24 hour   Intake 480 ml   Output 350 ml   Net 130 ml     I/O last 3 completed shifts:   In: 1200 [P.O.:1200]  Out: 625 [Urine:625]    Safety Concerns:     [employer] Safety Concerns:480010237}    Impairments/Disabilities:      [employer] Impairments/Disabilities:744319575}    Nutrition Therapy:  Current Nutrition Therapy:   [employer] Diet List:672580523}    Routes of Feeding: {[facility] DME Other Feedings:759961142}  Liquids: {Slp liquid thickness:89340}  Daily Fluid Restriction: {CHP DME Yes amt example:503501673}  Last Modified Barium Swallow with Video (Video Swallowing Test): {Done Not Done UNTK:690784348}    Treatments at the Time of Hospital Discharge:   Respiratory Treatments: ***  Oxygen Therapy:  {Therapy; copd oxygen:17213}  Ventilator:    { CC Vent IWEO:670685570}    Rehab Therapies: Nurse; HRS if patient agreeable  Weight Bearing Status/Restrictions: [employer]  Weight Bearin}  Other Medical Equipment (for information only, NOT a DME order):  {EQUIPMENT:014479605}  Other Treatments: HOME HEALTH CARE: LEVEL 3 [address] to establish plan of care for patient over 60 day period   Nursing  Initial home SN evaluation visit to occur within 24-48 hours for:  1)  medication management  2)  VS and clinical assessment  3)  S&S chronic disease exacerbation education + when to contact MD/NP  4)  care coordination  Medication Reconciliation during 1st SN visit  PT/OT/Speech   Evaluations in home within 24-48 hours of discharge to include DME and home safety   Frontload therapy 5 days, then 3x a week   OT to evaluate if patient has [address] needs for personal care    evaluation within 24-48 hours to evaluate resources & insurance for potential AL, [place], LTC, and Medicaid options   Palliative Care referral within 5 days of hospital discharge   PCP Visit scheduled within 3 - 7 days of hospital discharge    [address] (If patient is agreeable and meets guidelines)      Patient's personal belongings (please select all that are sent with patient):  {CHP DME Belongings:142431943}    RN SIGNATURE:  {Esignature:515885455}    CASE MANAGEMENT/SOCIAL WORK SECTION    Inpatient Status Date: 1/3/2023    Readmission Risk Assessment Score:  Readmission Risk              Risk of Unplanned Readmission:  16           Discharging to Facility/ Agency   Name: [facility]  Address:  Phone:[phone]  Fax:    Dialysis Facility (if applicable)   Name:  Address:  Dialysis Schedule:  Phone:  Fax:    / signature: Electronically signed by Barbara Li RN on 1/9/23 at 1:40 PM EST    PHYSICIAN SECTION    Prognosis: Good    Condition at Discharge: Stable    Rehab Potential (if transferring to Rehab):     Recommended Labs or Other Treatments After Discharge:     Physician Certification: I certify the above information and transfer of Vicki Cotter  is necessary for the continuing treatment of the diagnosis listed and that he requires Home Care for less 30 days.      Update Admission H&P: No change in H&P    PHYSICIAN SIGNATURE:  ALETHA Caballero MD/Electronically signed by Barbara Li RN on 1/9/23 at 1:40 PM EST

## 2023-01-09 NOTE — CARE COORDINATION
DISCHARGE ORDER  Date/Time 2023 1:32 PM  Completed by: Shanita Mariscal RN, Case Management    Patient Name: Payton Wagner      : 1950  Admitting Diagnosis: Hypokalemia [E87.6]  Hypomagnesemia [E83.42]  Acute on chronic respiratory failure ([facility] [J96.20]  COVID-19 [U07.1]  Acute respiratory failure due to COVID-19 ([facility] [U07.1, J96.00]      Admit order Date and Status:1/3/2023  (verify MD's last order for status of admission)      Noted discharge order. If applicable PT/OT recommendation at Discharge: Home PRN assist (would benefit from initial 24hr spv when returning home)  DME recommendation by PT/OT: Needs Met  Confirmed discharge plan: Yes  with whom___pt____________  If pt confirmed DC plan does family need to be contacted by CM No if yes who______  Discharge Plan: Chart reviewed. Met with pt at bedside and he is returning home and his spouse will be staying with him. Pt is requesting home care for SN, pt has no preference in agency. TC to Cindi from [facility] and they can accept the pt with auth from [place]. Writer called [place] ([phone] requested home care auth, spoke with Chen Alvarez. [facility] is [facility] Lozano, spoke with her RN Fariha Bruno and she approved the auth for home care for Satinder Spencer @ [facility] made aware. Pt aware to have spouse bring home O2 tank from home for transport home. No further dc needs. Date of Last IMM Given: 2023    Reviewed chart. Role of discharge planner explained and patient verbalized understanding. Discharge order is noted. Has Home O2 in place on admit:  Yes  Informed of need to bring portable home O2 tank on day of discharge for nursing to connect prior to leaving:   Yes  Verbalized agreement/Understanding:   Yes  Pt is being d/c'd to home today. Pt's O2 sats are 92% on 3L. Discharge timeout done with Bety. All discharge needs and concerns addressed.

## 2023-01-09 NOTE — PROGRESS NOTES
Patient discharge instructions gone over at this time; new medication decadron instructed to  At [facility] pharmacy in [place]; teaching completed. Patient verbalized understanding. Patient on 3 L o2; instructed on new requirement of o2. Patient verbalize understanding; transport placed.

## 2023-01-09 NOTE — PROGRESS NOTES
PULMONARY PROGRESS NOTE  CC: Increasing shortness of breath x1 week, admitted for COPD exacerbation, COVID positive    Subjective:   O2 was weaned yesterday but subsequently required up to 6 L.   He is currently down to 4 L.    IV line: Peripheral    PHYSICAL EXAM:   /74   Pulse 60   Temp 98.2 °F (36.8 °C) (Oral)   Resp 18   Ht 5' 11\" (1.803 m)   Wt 150 lb (68 kg)   SpO2 92%   BMI 20.92 kg/m² ' on 3 L  Constitutional:  No acute distress   HEENT:  No scleral icterus  Neck:  No tracheal deviation present.    Cardiovascular:  Normal heart sounds.   Pulmonary/Chest:  No wheezes. No rhonchi. No rales. ++ decreased breath sounds.  No accessory muscle usage or stridor.   Abdominal:  Soft.   Musculoskeletal:  No cyanosis. No clubbing.  Skin:  Skin is warm and dry.     Scheduled Meds:   dexamethasone  4 mg IntraVENous Q12H    dronabinol  5 mg Oral BID    sodium chloride flush  5-40 mL IntraVENous 2 times per day    enoxaparin  30 mg SubCUTAneous BID    pantoprazole  40 mg Oral Daily    gabapentin  300 mg Oral TID    albuterol-ipratropium  1 puff Inhalation 4x daily     Continuous Infusions:   sodium chloride       PRN Meds:  LORazepam, sodium chloride flush, sodium chloride, ondansetron **OR** ondansetron, polyethylene glycol, acetaminophen **OR** acetaminophen, potassium chloride **OR** potassium alternative oral replacement **OR** potassium chloride, magnesium sulfate, albuterol-ipratropium    Labs:  CBC:   No results for input(s): WBC, HGB, HCT, MCV, PLT in the last 72 hours.    BMP:   No results for input(s): NA, K, CL, CO2, PHOS, BUN, CREATININE, CA in the last 72 hours.    Micro:   1/3/23 SARS-CoV-2 DETECTED    Procalcitonin 0.08 > 0.08  CRP 10.8  proBNP 119    Imaging: Chest imaging was reviewed by me and showed:  CTPA 1/3/2023  1. Negative for pulmonary embolus.   2. 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   3. Moderate  to severe pathologic T3 compression fracture. CXR 1/6/2023  1. No acute cardiopulmonary findings. 2.  Stable appearance of the known right upper lobe cavitary mass. ASSESSMENT:  Acute on chronic hypoxemic & hypercapnic respiratory failure; baseline 1.5 - 2.5 L O2  COVID pneumonia  COPD with acute exacerbation  Metastatic non-small cell lung cancer, f/w [facility] patient    PLAN:  COVID-19 isolation, droplet plus  Supplemental oxygen to maintain SaO2 >92%; wean as tolerated    Inhaled bronchodilators   Steroid D#7, Decadron 4 mg BID -- d/c with longer 10-12 day taper   Okay with me for home.   [facility]

## 2023-01-09 NOTE — PROGRESS NOTES
RT Inhaler-Nebulizer Bronchodilator Protocol Note    There is a bronchodilator order in the chart from a provider indicating to follow the RT Bronchodilator Protocol and there is an Initiate RT Inhaler-Nebulizer Bronchodilator Protocol order as well (see protocol at bottom of note). CXR Findings:  No results found. The findings from the last RT Protocol Assessment were as follows:   History Pulmonary Disease: (P) Chronic pulmonary disease  Respiratory Pattern: (P) Dyspnea on exertion or RR 21-25 bpm  Breath Sounds: (P) Slightly diminished and/or crackles  Cough: (P) Strong, spontaneous, non-productive  Indication for Bronchodilator Therapy: (P) Decreased or absent breath sounds  Bronchodilator Assessment Score: (P) 6    Aerosolized bronchodilator medication orders have been revised according to the RT Inhaler-Nebulizer Bronchodilator Protocol below. Respiratory Therapist to perform RT Therapy Protocol Assessment initially then follow the protocol. Repeat RT Therapy Protocol Assessment PRN for score 0-3 or on second treatment, BID, and PRN for scores above 3. No Indications - adjust the frequency to every 6 hours PRN wheezing or bronchospasm, if no treatments needed after 48 hours then discontinue using Per Protocol order mode. If indication present, adjust the RT bronchodilator orders based on the Bronchodilator Assessment Score as indicated below. Use Inhaler orders unless patient has one or more of the following: on home nebulizer, not able to hold breath for 10 seconds, is not alert and oriented, cannot activate and use MDI correctly, or respiratory rate 25 breaths per minute or more, then use the equivalent nebulizer order(s) with same Frequency and PRN reasons based on the score. If a patient is on this medication at home then do not decrease Frequency below that used at home.     0-3 - enter or revise RT bronchodilator order(s) to equivalent RT Bronchodilator order with Frequency of every 4 hours PRN for wheezing or increased work of breathing using Per Protocol order mode. 4-6 - enter or revise RT Bronchodilator order(s) to two equivalent RT bronchodilator orders with one order with BID Frequency and one order with Frequency of every 4 hours PRN wheezing or increased work of breathing using Per Protocol order mode. 7-10 - enter or revise RT Bronchodilator order(s) to two equivalent RT bronchodilator orders with one order with TID Frequency and one order with Frequency of every 4 hours PRN wheezing or increased work of breathing using Per Protocol order mode. 11-13 - enter or revise RT Bronchodilator order(s) to one equivalent RT bronchodilator order with QID Frequency and an Albuterol order with Frequency of every 4 hours PRN wheezing or increased work of breathing using Per Protocol order mode. Greater than 13 - enter or revise RT Bronchodilator order(s) to one equivalent RT bronchodilator order with every 4 hours Frequency and an Albuterol order with Frequency of every 2 hours PRN wheezing or increased work of breathing using Per Protocol order mode. RT to enter RT Home Evaluation for COPD & MDI Assessment order using Per Protocol order mode.     Electronically signed by Arash Arciniega RCP on 1/9/2023 at 7:22 AM

## 2023-01-09 NOTE — PLAN OF CARE
Problem: Safety - Adult  Goal: Free from fall injury  1/8/2023 1930 by Adwoa Tello RN  Outcome: Progressing  1/8/2023 1013 by Sanjay Estrada, RN  Outcome: Progressing     Problem: Nutrition Deficit:  Goal: Optimize nutritional status  Outcome: Progressing     Problem: Pain  Goal: Verbalizes/displays adequate comfort level or baseline comfort level  1/8/2023 1930 by Adwoa Tello RN  Outcome: Progressing  1/8/2023 1013 by Sanjay Estrada, RN  Outcome: Progressing

## 2023-01-09 NOTE — PROGRESS NOTES
IM Progress Note    Admit Date:  1/3/2023    Patient with history of COPD and lung cancer presenting with increased shortness of breath, admitted for exacerbation of COPD, COVID-positive. Patient normally on oxygen 1 to 2 L, required O2 up to 3 L on presentation  Worsening hypoxemia-O2 requirement progressively up to 7 L    Patient is starting to feel better the last couple of days , hypoxemia improving       Subjective:  Mr. Lenell Fothergill looks and feels much better. Now on 4 L     Patient asking about going home. Discussed with patient that O2 requirement needs to be down to 1 to 2 L, close to his baseline, and he should not have nocturnal desaturation prior to discharge. Monitor O2 sats tonight and hopefully can be weaned some more tomorrow      Objective:   /71   Pulse 61   Temp 97.9 °F (36.6 °C) (Oral)   Resp 18   Ht 5' 11\" (1.803 m)   Wt 150 lb (68 kg)   SpO2 91%   BMI 20.92 kg/m²     Intake/Output Summary (Last 24 hours) at 1/9/2023 0753  Last data filed at 1/9/2023 0318  Gross per 24 hour   Intake 960 ml   Output 625 ml   Net 335 ml           Physical Exam:  General:  Awake, alert, NAD  Skin:  Warm and dry  Neck:  JVD absent. Neck supple  Chest: Diminished breath sounds. No wheezes , no rales or rhonchi. Cardiovascular:  RRR ,S1S2 normal  Abdomen:  Soft, non tender, non distended, BS +  Extremities:  No edema. Intact peripheral pulses. Brisk cap refill, < 2 secs  Neuro: non focal      Medications:   Scheduled Meds:   dexamethasone  4 mg IntraVENous Q12H    dronabinol  5 mg Oral BID    sodium chloride flush  5-40 mL IntraVENous 2 times per day    enoxaparin  30 mg SubCUTAneous BID    pantoprazole  40 mg Oral Daily    gabapentin  300 mg Oral TID    albuterol-ipratropium  1 puff Inhalation 4x daily       Continuous Infusions:   sodium chloride         Data:  CBC:   No results for input(s): WBC, RBC, HGB, HCT, MCV, RDW, PLT in the last 72 hours.     BMP:   No results for input(s): NA, K, CL, CO2, PHOS, BUN, CREATININE, CA in the last 72 hours. BNP: No results for input(s): BNP in the last 72 hours. PT/INR: No results for input(s): PROTIME, INR in the last 72 hours. APTT: No results for input(s): APTT in the last 72 hours. CARDIAC ENZYMES:   No results for input(s): CKMB, CKMBINDEX, TROPONINI in the last 72 hours. Invalid input(s): CKTOTAL;3    FASTING LIPID PANEL:No results found for: CHOL, HDL, TRIG  LIVER PROFILE:   No results for input(s): AST, ALT, ALB, BILIDIR, BILITOT, ALKPHOS in the last 72 hours. Cultures  Covid positive  Influenza not detected        Radiology  XR CHEST PORTABLE   Final Result   1. No acute cardiopulmonary findings. 2.  Stable appearance of the known right upper lobe cavitary mass. CT CHEST PULMONARY EMBOLISM W CONTRAST   Final Result   1. Negative for pulmonary embolus. 2. Cavitary 6 cm right upper lobe bronchogenic carcinoma with associated   rightward T2, T3 and T4 vertebral body/costal invasion. 3. Moderate to severe pathologic T3 compression fracture. Assessment:  Principal Problem:    Acute and chronic respiratory failure with hypoxia (HCC)  Active Problems:    Acute exacerbation of chronic obstructive pulmonary disease (COPD) (HCC)    Lung cancer (HCC)    COVID-19    Hypomagnesemia    Hypokalemia    Acute on chronic respiratory failure with hypoxemia (HCC)    Chronic obstructive pulmonary disease with acute exacerbation (HCC)  Resolved Problems:    * No resolved hospital problems. *      Plan:    #Acute on chronic hypoxic respiratory failure  #COPD AE  #COVID  -presented with increasing SOB and productive cough x1 week  -baseline O2 requirement 1L; patient was requiring oxygen 3 L on admission. He developed worsening hypoxemia with increasing O2 requirement, progressively up to 7 L.  Continued supplemental oxygen and weaned as tolerated-> O2 weaned down to 3 L today  -CTPA negative   -Continue Decadron , dose increased to 6 mg IV twice daily on 1/5 ; currently dose adjusted to 4 mg IV twice daily. Continue current dosing, hypoxemia improving  -Continue inhaled bronchodilators. -Pulmonology following   -droplet plus precautions     #Lung cancer  -s/p chemo and radiation  -follows w/ [facility]     #Hypomagnesemia  #Hypokalemia  -repleted   -monitor      #HTN  -BP low on admission  -hold lisinopril and amlodipine for now    BP stable      #GERD  -continue PPI     # Anxiety   -Check home medication-could not clarify home medication. Will use Ativan as needed for anxiety     DVT Prophylaxis: Lovenox  ADULT DIET;  Regular  ADULT ORAL NUTRITION SUPPLEMENT; Breakfast, Lunch, Dinner; Standard High Calorie/High Protein Oral Supplement   Full Code    Dc planning    Ceci Stephens MD   1/9/2023 7:53 AM

## 2023-01-09 NOTE — PLAN OF CARE
Problem: Discharge Planning  Goal: Discharge to home or other facility with appropriate resources  Recent Flowsheet Documentation  Taken 1/9/2023 0800 by Jennifer Carver RN  Discharge to home or other facility with appropriate resources: Identify barriers to discharge with patient and caregiver  1/8/2023 1930 by Torie Rushing RN  Outcome: Progressing     Problem: Safety - Adult  Goal: Free from fall injury  1/8/2023 1930 by Torie Rushing RN  Outcome: Progressing     Problem: Nutrition Deficit:  Goal: Optimize nutritional status  1/8/2023 1930 by Torie Rushing RN  Outcome: Progressing     Problem: Pain  Goal: Verbalizes/displays adequate comfort level or baseline comfort level  Recent Flowsheet Documentation  Taken 1/9/2023 0826 by Jennifer Carver RN  Verbalizes/displays adequate comfort level or baseline comfort level: Encourage patient to monitor pain and request assistance  1/8/2023 1930 by Torie Rushing RN  Outcome: Progressing

## 2023-04-06 ENCOUNTER — HOSPITAL ENCOUNTER (OUTPATIENT)
Dept: CT IMAGING | Age: 73
Discharge: HOME | End: 2023-04-06
Payer: OTHER GOVERNMENT

## 2023-04-06 DIAGNOSIS — C77.1 SECONDARY AND UNSPECIFIED MALIGNANT NEOPLASM OF INTRATHORACIC LYMPH NODES (HCC): ICD-10-CM

## 2023-04-06 DIAGNOSIS — C34.11 MALIGNANT NEOPLASM OF UPPER LOBE OF RIGHT LUNG (HCC): ICD-10-CM

## 2023-04-06 PROCEDURE — 6360000004 HC RX CONTRAST MEDICATION: Performed by: INTERNAL MEDICINE

## 2023-04-06 PROCEDURE — 71260 CT THORAX DX C+: CPT

## 2023-04-06 RX ADMIN — IOHEXOL 75 ML: 350 INJECTION, SOLUTION INTRAVENOUS at 13:02

## 2023-07-05 ENCOUNTER — HOSPITAL ENCOUNTER (OUTPATIENT)
Dept: CT IMAGING | Age: 73
Discharge: HOME | End: 2023-07-05
Attending: RADIOLOGY
Payer: OTHER GOVERNMENT

## 2023-07-05 DIAGNOSIS — C34.11 MALIGNANT NEOPLASM OF UPPER LOBE OF RIGHT LUNG (HCC): ICD-10-CM

## 2023-07-05 PROCEDURE — 71260 CT THORAX DX C+: CPT

## 2023-07-05 PROCEDURE — 6360000004 HC RX CONTRAST MEDICATION: Performed by: RADIOLOGY

## 2023-07-05 RX ADMIN — IOPAMIDOL 75 ML: 755 INJECTION, SOLUTION INTRAVENOUS at 14:35

## 2023-10-05 ENCOUNTER — HOSPITAL ENCOUNTER (OUTPATIENT)
Dept: CT IMAGING | Age: 73
Discharge: HOME | End: 2023-10-05
Attending: INTERNAL MEDICINE
Payer: OTHER GOVERNMENT

## 2023-10-05 DIAGNOSIS — C34.11 MALIGNANT NEOPLASM OF UPPER LOBE OF RIGHT LUNG (HCC): ICD-10-CM

## 2023-10-05 PROCEDURE — 74177 CT ABD & PELVIS W/CONTRAST: CPT

## 2023-10-05 PROCEDURE — 6360000004 HC RX CONTRAST MEDICATION: Performed by: INTERNAL MEDICINE

## 2023-10-05 RX ADMIN — DIATRIZOATE MEGLUMINE AND DIATRIZOATE SODIUM 12 ML: 660; 100 LIQUID ORAL; RECTAL at 10:21

## 2023-10-05 RX ADMIN — IOPAMIDOL 75 ML: 755 INJECTION, SOLUTION INTRAVENOUS at 10:21

## 2023-10-13 ENCOUNTER — HOSPITAL ENCOUNTER (OUTPATIENT)
Dept: CT IMAGING | Age: 73
Discharge: HOME | End: 2023-10-13
Payer: OTHER GOVERNMENT

## 2023-10-13 DIAGNOSIS — C34.11 MALIGNANT NEOPLASM OF UPPER LOBE OF RIGHT LUNG (HCC): ICD-10-CM

## 2023-10-13 PROCEDURE — 6360000004 HC RX CONTRAST MEDICATION

## 2023-10-13 PROCEDURE — 71260 CT THORAX DX C+: CPT

## 2023-10-13 RX ADMIN — IOPAMIDOL 75 ML: 755 INJECTION, SOLUTION INTRAVENOUS at 09:37

## 2023-11-08 ENCOUNTER — TELEPHONE (OUTPATIENT)
Dept: CARDIOLOGY CLINIC | Age: 73
End: 2023-11-08

## 2024-02-22 ENCOUNTER — HOSPITAL ENCOUNTER (OUTPATIENT)
Dept: CT IMAGING | Age: 74
Discharge: HOME | End: 2024-02-22
Payer: OTHER GOVERNMENT

## 2024-02-22 DIAGNOSIS — C77.1 SECONDARY AND UNSPECIFIED MALIGNANT NEOPLASM OF INTRATHORACIC LYMPH NODES (HCC): ICD-10-CM

## 2024-02-22 DIAGNOSIS — C34.11 MALIGNANT NEOPLASM OF UPPER LOBE OF RIGHT LUNG (HCC): ICD-10-CM

## 2024-02-22 LAB
PERFORMED ON: NORMAL
POC CREATININE: 1 MG/DL (ref 0.8–1.3)
POC SAMPLE TYPE: NORMAL

## 2024-02-22 PROCEDURE — 82565 ASSAY OF CREATININE: CPT

## 2024-02-22 PROCEDURE — 74177 CT ABD & PELVIS W/CONTRAST: CPT

## 2024-02-22 PROCEDURE — 6360000004 HC RX CONTRAST MEDICATION: Performed by: NURSE PRACTITIONER

## 2024-02-22 RX ADMIN — DIATRIZOATE MEGLUMINE AND DIATRIZOATE SODIUM 12 ML: 660; 100 LIQUID ORAL; RECTAL at 09:11

## 2024-02-22 RX ADMIN — IOPAMIDOL 75 ML: 755 INJECTION, SOLUTION INTRAVENOUS at 09:14

## 2024-05-22 ENCOUNTER — HOSPITAL ENCOUNTER (OUTPATIENT)
Dept: CT IMAGING | Age: 74
Discharge: HOME | End: 2024-05-22
Payer: OTHER GOVERNMENT

## 2024-05-22 DIAGNOSIS — C34.11 MALIGNANT NEOPLASM OF UPPER LOBE OF RIGHT LUNG (HCC): ICD-10-CM

## 2024-05-22 LAB
PERFORMED ON: NORMAL
POC CREATININE: 1.1 MG/DL (ref 0.8–1.3)
POC SAMPLE TYPE: NORMAL

## 2024-05-22 PROCEDURE — 82565 ASSAY OF CREATININE: CPT

## 2024-05-22 PROCEDURE — 74177 CT ABD & PELVIS W/CONTRAST: CPT

## 2024-05-22 PROCEDURE — 6360000004 HC RX CONTRAST MEDICATION: Performed by: NURSE PRACTITIONER

## 2024-05-22 RX ADMIN — DIATRIZOATE MEGLUMINE AND DIATRIZOATE SODIUM 12 ML: 660; 100 LIQUID ORAL; RECTAL at 09:46

## 2024-05-22 RX ADMIN — IOPAMIDOL 75 ML: 755 INJECTION, SOLUTION INTRAVENOUS at 09:46

## 2024-08-16 ENCOUNTER — HOSPITAL ENCOUNTER (OUTPATIENT)
Dept: CT IMAGING | Age: 74
Discharge: HOME | End: 2024-08-16
Attending: INTERNAL MEDICINE
Payer: OTHER GOVERNMENT

## 2024-08-16 DIAGNOSIS — C34.11 MALIGNANT NEOPLASM OF UPPER LOBE OF RIGHT LUNG (HCC): ICD-10-CM

## 2024-08-16 LAB
PERFORMED ON: NORMAL
POC CREATININE: 1 MG/DL (ref 0.8–1.3)
POC SAMPLE TYPE: NORMAL

## 2024-08-16 PROCEDURE — 6360000004 HC RX CONTRAST MEDICATION: Performed by: INTERNAL MEDICINE

## 2024-08-16 PROCEDURE — 74177 CT ABD & PELVIS W/CONTRAST: CPT

## 2024-08-16 RX ADMIN — IOPAMIDOL 75 ML: 755 INJECTION, SOLUTION INTRAVENOUS at 09:54

## 2024-08-16 RX ADMIN — DIATRIZOATE MEGLUMINE AND DIATRIZOATE SODIUM 12 ML: 660; 100 LIQUID ORAL; RECTAL at 09:53

## 2025-02-18 ENCOUNTER — HOSPITAL ENCOUNTER (OUTPATIENT)
Dept: CT IMAGING | Age: 75
Discharge: HOME | End: 2025-02-18
Attending: INTERNAL MEDICINE
Payer: OTHER GOVERNMENT

## 2025-02-18 DIAGNOSIS — C34.11 MALIGNANT NEOPLASM OF UPPER LOBE OF RIGHT LUNG (HCC): ICD-10-CM

## 2025-02-18 LAB
PERFORMED ON: NORMAL
POC CREATININE: 1.2 MG/DL (ref 0.8–1.3)
POC SAMPLE TYPE: NORMAL

## 2025-02-18 PROCEDURE — 6360000004 HC RX CONTRAST MEDICATION: Performed by: INTERNAL MEDICINE

## 2025-02-18 PROCEDURE — 82565 ASSAY OF CREATININE: CPT

## 2025-02-18 PROCEDURE — 71260 CT THORAX DX C+: CPT

## 2025-02-18 RX ORDER — DIATRIZOATE MEGLUMINE AND DIATRIZOATE SODIUM 660; 100 MG/ML; MG/ML
12 SOLUTION ORAL; RECTAL
Status: COMPLETED | OUTPATIENT
Start: 2025-02-18 | End: 2025-02-18

## 2025-02-18 RX ORDER — IOPAMIDOL 755 MG/ML
75 INJECTION, SOLUTION INTRAVASCULAR
Status: COMPLETED | OUTPATIENT
Start: 2025-02-18 | End: 2025-02-18

## 2025-02-18 RX ADMIN — DIATRIZOATE MEGLUMINE AND DIATRIZOATE SODIUM 12 ML: 660; 100 LIQUID ORAL; RECTAL at 09:49

## 2025-02-18 RX ADMIN — IOPAMIDOL 75 ML: 755 INJECTION, SOLUTION INTRAVENOUS at 09:49

## 2025-04-14 ENCOUNTER — HOSPITAL ENCOUNTER (OUTPATIENT)
Dept: ULTRASOUND IMAGING | Age: 75
Discharge: HOME | End: 2025-04-14
Payer: OTHER GOVERNMENT

## 2025-04-14 DIAGNOSIS — E04.1 NONTOXIC UNINODULAR GOITER: ICD-10-CM

## 2025-04-14 DIAGNOSIS — C34.11 MALIGNANT NEOPLASM OF UPPER LOBE OF RIGHT LUNG (HCC): ICD-10-CM

## 2025-04-14 PROCEDURE — 76536 US EXAM OF HEAD AND NECK: CPT

## 2025-05-21 ENCOUNTER — HOSPITAL ENCOUNTER (OUTPATIENT)
Dept: CT IMAGING | Age: 75
Discharge: HOME | End: 2025-05-21
Attending: INTERNAL MEDICINE
Payer: OTHER GOVERNMENT

## 2025-05-21 DIAGNOSIS — C34.11 MALIGNANT NEOPLASM OF UPPER LOBE OF RIGHT LUNG (HCC): ICD-10-CM

## 2025-05-21 LAB
PERFORMED ON: NORMAL
POC CREATININE: 1.1 MG/DL (ref 0.8–1.3)
POC SAMPLE TYPE: NORMAL

## 2025-05-21 PROCEDURE — 82565 ASSAY OF CREATININE: CPT

## 2025-05-21 PROCEDURE — 71260 CT THORAX DX C+: CPT

## 2025-05-21 PROCEDURE — 6360000004 HC RX CONTRAST MEDICATION: Performed by: INTERNAL MEDICINE

## 2025-05-21 RX ORDER — DIATRIZOATE MEGLUMINE AND DIATRIZOATE SODIUM 660; 100 MG/ML; MG/ML
12 SOLUTION ORAL; RECTAL
Status: COMPLETED | OUTPATIENT
Start: 2025-05-21 | End: 2025-05-21

## 2025-05-21 RX ORDER — IOPAMIDOL 755 MG/ML
75 INJECTION, SOLUTION INTRAVASCULAR
Status: COMPLETED | OUTPATIENT
Start: 2025-05-21 | End: 2025-05-21

## 2025-05-21 RX ADMIN — IOPAMIDOL 75 ML: 755 INJECTION, SOLUTION INTRAVENOUS at 10:04

## 2025-05-21 RX ADMIN — DIATRIZOATE MEGLUMINE AND DIATRIZOATE SODIUM 12 ML: 660; 100 LIQUID ORAL; RECTAL at 10:04

## 2025-06-06 ENCOUNTER — TELEPHONE (OUTPATIENT)
Dept: PULMONOLOGY | Age: 75
End: 2025-06-06

## 2025-06-10 ENCOUNTER — TELEPHONE (OUTPATIENT)
Dept: PULMONOLOGY | Age: 75
End: 2025-06-10

## 2025-06-10 NOTE — TELEPHONE ENCOUNTER
Spoke with PT wife, she stated she [facility] with the [facility] to get a [facility]. I called the [facility] LVM asking for them to send the referral.     [facility] phone number [phone]  Ext. [phone].

## 2025-06-11 NOTE — TELEPHONE ENCOUNTER
Spoke with the [facility] office and they confirmed they are waiting on the doctor to sign off on the referral.

## 2025-06-12 ENCOUNTER — OFFICE VISIT (OUTPATIENT)
Dept: PULMONOLOGY | Age: 75
End: 2025-06-12
Payer: OTHER GOVERNMENT

## 2025-06-12 VITALS
OXYGEN SATURATION: 90 % | HEART RATE: 83 BPM | SYSTOLIC BLOOD PRESSURE: 120 MMHG | BODY MASS INDEX: 29.73 KG/M2 | RESPIRATION RATE: 14 BRPM | HEIGHT: 71 IN | DIASTOLIC BLOOD PRESSURE: 82 MMHG | WEIGHT: 212.4 LBS

## 2025-06-12 DIAGNOSIS — C34.11 MALIGNANT NEOPLASM OF UPPER LOBE OF RIGHT LUNG (HCC): ICD-10-CM

## 2025-06-12 DIAGNOSIS — R91.8 LUNG MASS: Primary | ICD-10-CM

## 2025-06-12 PROCEDURE — 99204 OFFICE O/P NEW MOD 45 MIN: CPT | Performed by: INTERNAL MEDICINE

## 2025-06-12 PROCEDURE — 1123F ACP DISCUSS/DSCN MKR DOCD: CPT | Performed by: INTERNAL MEDICINE

## 2025-06-12 RX ORDER — LEVOTHYROXINE SODIUM 25 UG/1
25 TABLET ORAL DAILY
COMMUNITY

## 2025-06-12 RX ORDER — ALBUTEROL SULFATE 90 UG/1
2 INHALANT RESPIRATORY (INHALATION) EVERY 6 HOURS PRN
COMMUNITY

## 2025-06-12 NOTE — TELEPHONE ENCOUNTER
Once the [facility] referral  approval has been sent to us we need to take a look at this to see if the Robotic ION bronch will be covered. Dr. Dial needs to  do this procedure  in 2 to 3 weeks.

## 2025-06-12 NOTE — PROGRESS NOTES
size and consolidation part  Sent for biopsy   If approved by [facility] will do robotic/ebus   All risks, benefits, alternatives and potential complications explained thoroughly including, but not limited to, bleeding, infection, lung injury, pneumothorax . , heart attack, prolonged ventilation,  and even death, and the patient agrees to proceed.      Thank you very much for allowing me to participate in the care of this pleasant patient , should you have any questions ,please do not hesitate to contact me      Yaneth Dial MD,[facility]  Pulmonary&Critical Care Medicine    HEMA

## 2025-06-20 NOTE — TELEPHONE ENCOUNTER
Spoke with [facility] Nurse. She has sent another Message to [facility] to see what the hold up.     She advise to follow up with them after noon. [phone].

## 2025-06-23 NOTE — TELEPHONE ENCOUNTER
Spoke with Julia from [facility]. She said [facility] needs us to send Imaging over in order for them to give clareance to come to our office.     Julia Call back is [phone]     Received fax for image records. Took request to radiology department.